# Patient Record
Sex: FEMALE | Race: WHITE | Employment: OTHER | URBAN - METROPOLITAN AREA
[De-identification: names, ages, dates, MRNs, and addresses within clinical notes are randomized per-mention and may not be internally consistent; named-entity substitution may affect disease eponyms.]

---

## 2017-04-23 ENCOUNTER — APPOINTMENT (OUTPATIENT)
Dept: CT IMAGING | Age: 82
DRG: 291 | End: 2017-04-23
Attending: EMERGENCY MEDICINE
Payer: MEDICARE

## 2017-04-23 ENCOUNTER — HOSPITAL ENCOUNTER (INPATIENT)
Age: 82
LOS: 4 days | Discharge: NURSING, REHAB OR LONG-TERM CARE | DRG: 291 | End: 2017-04-27
Attending: EMERGENCY MEDICINE | Admitting: INTERNAL MEDICINE
Payer: MEDICARE

## 2017-04-23 ENCOUNTER — APPOINTMENT (OUTPATIENT)
Dept: GENERAL RADIOLOGY | Age: 82
DRG: 291 | End: 2017-04-23
Attending: EMERGENCY MEDICINE
Payer: MEDICARE

## 2017-04-23 DIAGNOSIS — E87.70 HYPERVOLEMIA, UNSPECIFIED HYPERVOLEMIA TYPE: Primary | ICD-10-CM

## 2017-04-23 DIAGNOSIS — R06.02 SOB (SHORTNESS OF BREATH): ICD-10-CM

## 2017-04-23 PROBLEM — J96.91 RESPIRATORY FAILURE WITH HYPOXIA (HCC): Status: ACTIVE | Noted: 2017-04-23

## 2017-04-23 PROBLEM — I50.33 ACUTE ON CHRONIC DIASTOLIC (CONGESTIVE) HEART FAILURE (HCC): Status: ACTIVE | Noted: 2017-04-23

## 2017-04-23 LAB
ALBUMIN SERPL BCP-MCNC: 3.3 G/DL (ref 3.5–5)
ALBUMIN/GLOB SERPL: 1 {RATIO} (ref 1.1–2.2)
ALP SERPL-CCNC: 61 U/L (ref 45–117)
ALT SERPL-CCNC: 16 U/L (ref 12–78)
AMORPH CRY URNS QL MICRO: ABNORMAL
ANION GAP BLD CALC-SCNC: 9 MMOL/L (ref 5–15)
APPEARANCE UR: CLEAR
APTT PPP: 28 SEC (ref 22.1–32.5)
AST SERPL W P-5'-P-CCNC: 23 U/L (ref 15–37)
BACTERIA URNS QL MICRO: NEGATIVE /HPF
BASOPHILS # BLD AUTO: 0 K/UL
BASOPHILS # BLD: 1 %
BILIRUB SERPL-MCNC: 1.3 MG/DL (ref 0.2–1)
BILIRUB UR QL: NEGATIVE
BNP SERPL-MCNC: ABNORMAL PG/ML (ref 0–450)
BUN SERPL-MCNC: 38 MG/DL (ref 6–20)
BUN/CREAT SERPL: 29 (ref 12–20)
CALCIUM SERPL-MCNC: 8.8 MG/DL (ref 8.5–10.1)
CHLORIDE SERPL-SCNC: 108 MMOL/L (ref 97–108)
CK SERPL-CCNC: 92 U/L (ref 26–192)
CO2 SERPL-SCNC: 24 MMOL/L (ref 21–32)
COLOR UR: ABNORMAL
CREAT SERPL-MCNC: 1.29 MG/DL (ref 0.55–1.02)
DIFFERENTIAL METHOD BLD: ABNORMAL
EOSINOPHIL # BLD: 0.1 K/UL
EOSINOPHIL NFR BLD: 2 %
EPITH CASTS URNS QL MICRO: ABNORMAL /LPF
ERYTHROCYTE [DISTWIDTH] IN BLOOD BY AUTOMATED COUNT: 15.3 % (ref 11.5–14.5)
GLOBULIN SER CALC-MCNC: 3.2 G/DL (ref 2–4)
GLUCOSE BLD STRIP.AUTO-MCNC: 101 MG/DL (ref 65–100)
GLUCOSE SERPL-MCNC: 94 MG/DL (ref 65–100)
GLUCOSE UR STRIP.AUTO-MCNC: NEGATIVE MG/DL
HCT VFR BLD AUTO: 45.3 % (ref 35–47)
HGB BLD-MCNC: 14.4 G/DL (ref 11.5–16)
HGB UR QL STRIP: ABNORMAL
INR PPP: 1.3 (ref 0.9–1.1)
KETONES UR QL STRIP.AUTO: 15 MG/DL
LEUKOCYTE ESTERASE UR QL STRIP.AUTO: NEGATIVE
LYMPHOCYTES # BLD AUTO: 7 %
LYMPHOCYTES # BLD: 0.3 K/UL
MCH RBC QN AUTO: 29.9 PG (ref 26–34)
MCHC RBC AUTO-ENTMCNC: 31.8 G/DL (ref 30–36.5)
MCV RBC AUTO: 94.2 FL (ref 80–99)
MONOCYTES # BLD: 0.4 K/UL
MONOCYTES NFR BLD AUTO: 8 %
NEUTS SEG # BLD: 3.7 K/UL
NEUTS SEG NFR BLD AUTO: 82 %
NITRITE UR QL STRIP.AUTO: NEGATIVE
PH UR STRIP: 6 [PH] (ref 5–8)
PLATELET # BLD AUTO: 137 K/UL (ref 150–400)
POTASSIUM SERPL-SCNC: 5.1 MMOL/L (ref 3.5–5.1)
PROT SERPL-MCNC: 6.5 G/DL (ref 6.4–8.2)
PROT UR STRIP-MCNC: ABNORMAL MG/DL
PROTHROMBIN TIME: 12.7 SEC (ref 9–11.1)
RBC # BLD AUTO: 4.81 M/UL (ref 3.8–5.2)
RBC #/AREA URNS HPF: ABNORMAL /HPF (ref 0–5)
RBC MORPH BLD: ABNORMAL
SERVICE CMNT-IMP: ABNORMAL
SODIUM SERPL-SCNC: 141 MMOL/L (ref 136–145)
SP GR UR REFRACTOMETRY: 1.02 (ref 1–1.03)
THERAPEUTIC RANGE,PTTT: NORMAL SECS (ref 58–77)
TROPONIN I SERPL-MCNC: <0.04 NG/ML
UROBILINOGEN UR QL STRIP.AUTO: 0.2 EU/DL (ref 0.2–1)
WBC # BLD AUTO: 4.5 K/UL (ref 3.6–11)
WBC URNS QL MICRO: ABNORMAL /HPF (ref 0–4)

## 2017-04-23 PROCEDURE — 77030005563 HC CATH URETH INT MMGH -A

## 2017-04-23 PROCEDURE — 80053 COMPREHEN METABOLIC PANEL: CPT | Performed by: EMERGENCY MEDICINE

## 2017-04-23 PROCEDURE — 51701 INSERT BLADDER CATHETER: CPT

## 2017-04-23 PROCEDURE — 74011000250 HC RX REV CODE- 250: Performed by: EMERGENCY MEDICINE

## 2017-04-23 PROCEDURE — 74011250637 HC RX REV CODE- 250/637: Performed by: EMERGENCY MEDICINE

## 2017-04-23 PROCEDURE — 82550 ASSAY OF CK (CPK): CPT | Performed by: EMERGENCY MEDICINE

## 2017-04-23 PROCEDURE — 84484 ASSAY OF TROPONIN QUANT: CPT | Performed by: EMERGENCY MEDICINE

## 2017-04-23 PROCEDURE — 85610 PROTHROMBIN TIME: CPT | Performed by: EMERGENCY MEDICINE

## 2017-04-23 PROCEDURE — 82962 GLUCOSE BLOOD TEST: CPT

## 2017-04-23 PROCEDURE — 83880 ASSAY OF NATRIURETIC PEPTIDE: CPT | Performed by: EMERGENCY MEDICINE

## 2017-04-23 PROCEDURE — 94640 AIRWAY INHALATION TREATMENT: CPT

## 2017-04-23 PROCEDURE — 74011250636 HC RX REV CODE- 250/636: Performed by: EMERGENCY MEDICINE

## 2017-04-23 PROCEDURE — 65270000029 HC RM PRIVATE

## 2017-04-23 PROCEDURE — 70450 CT HEAD/BRAIN W/O DYE: CPT

## 2017-04-23 PROCEDURE — 77030013140 HC MSK NEB VYRM -A

## 2017-04-23 PROCEDURE — 74011250637 HC RX REV CODE- 250/637: Performed by: INTERNAL MEDICINE

## 2017-04-23 PROCEDURE — 85025 COMPLETE CBC W/AUTO DIFF WBC: CPT | Performed by: EMERGENCY MEDICINE

## 2017-04-23 PROCEDURE — 85730 THROMBOPLASTIN TIME PARTIAL: CPT | Performed by: EMERGENCY MEDICINE

## 2017-04-23 PROCEDURE — 71010 XR CHEST PORT: CPT

## 2017-04-23 PROCEDURE — 81001 URINALYSIS AUTO W/SCOPE: CPT | Performed by: EMERGENCY MEDICINE

## 2017-04-23 PROCEDURE — 36415 COLL VENOUS BLD VENIPUNCTURE: CPT | Performed by: EMERGENCY MEDICINE

## 2017-04-23 PROCEDURE — 99285 EMERGENCY DEPT VISIT HI MDM: CPT

## 2017-04-23 RX ORDER — SODIUM CHLORIDE 0.9 % (FLUSH) 0.9 %
5-10 SYRINGE (ML) INJECTION AS NEEDED
Status: DISCONTINUED | OUTPATIENT
Start: 2017-04-23 | End: 2017-04-27 | Stop reason: HOSPADM

## 2017-04-23 RX ORDER — DEXTROSE 50 % IN WATER (D50W) INTRAVENOUS SYRINGE
12.5-25 AS NEEDED
Status: DISCONTINUED | OUTPATIENT
Start: 2017-04-23 | End: 2017-04-27 | Stop reason: HOSPADM

## 2017-04-23 RX ORDER — NYSTATIN 100000 [USP'U]/G
POWDER TOPICAL
Status: COMPLETED | OUTPATIENT
Start: 2017-04-23 | End: 2017-04-23

## 2017-04-23 RX ORDER — FUROSEMIDE 10 MG/ML
60 INJECTION INTRAMUSCULAR; INTRAVENOUS 2 TIMES DAILY
Status: DISCONTINUED | OUTPATIENT
Start: 2017-04-24 | End: 2017-04-25

## 2017-04-23 RX ORDER — FUROSEMIDE 10 MG/ML
60 INJECTION INTRAMUSCULAR; INTRAVENOUS
Status: COMPLETED | OUTPATIENT
Start: 2017-04-23 | End: 2017-04-23

## 2017-04-23 RX ORDER — METOPROLOL SUCCINATE 50 MG/1
50 TABLET, EXTENDED RELEASE ORAL DAILY
Status: DISCONTINUED | OUTPATIENT
Start: 2017-04-24 | End: 2017-04-25

## 2017-04-23 RX ORDER — SODIUM CHLORIDE 0.9 % (FLUSH) 0.9 %
5-10 SYRINGE (ML) INJECTION EVERY 8 HOURS
Status: DISCONTINUED | OUTPATIENT
Start: 2017-04-23 | End: 2017-04-27 | Stop reason: HOSPADM

## 2017-04-23 RX ORDER — LISINOPRIL 20 MG/1
40 TABLET ORAL DAILY
Status: DISCONTINUED | OUTPATIENT
Start: 2017-04-24 | End: 2017-04-27 | Stop reason: HOSPADM

## 2017-04-23 RX ORDER — INSULIN GLARGINE 100 [IU]/ML
35 INJECTION, SOLUTION SUBCUTANEOUS DAILY
Status: DISCONTINUED | OUTPATIENT
Start: 2017-04-24 | End: 2017-04-24

## 2017-04-23 RX ORDER — ONDANSETRON 2 MG/ML
4 INJECTION INTRAMUSCULAR; INTRAVENOUS
Status: DISCONTINUED | OUTPATIENT
Start: 2017-04-23 | End: 2017-04-27 | Stop reason: HOSPADM

## 2017-04-23 RX ORDER — OXYBUTYNIN CHLORIDE 5 MG/1
5 TABLET, EXTENDED RELEASE ORAL DAILY
Status: DISCONTINUED | OUTPATIENT
Start: 2017-04-24 | End: 2017-04-27 | Stop reason: HOSPADM

## 2017-04-23 RX ORDER — ESCITALOPRAM OXALATE 10 MG/1
10 TABLET ORAL DAILY
Status: DISCONTINUED | OUTPATIENT
Start: 2017-04-24 | End: 2017-04-25

## 2017-04-23 RX ORDER — IPRATROPIUM BROMIDE AND ALBUTEROL SULFATE 2.5; .5 MG/3ML; MG/3ML
3 SOLUTION RESPIRATORY (INHALATION) ONCE
Status: COMPLETED | OUTPATIENT
Start: 2017-04-23 | End: 2017-04-23

## 2017-04-23 RX ORDER — ACETAMINOPHEN 325 MG/1
650 TABLET ORAL
Status: DISCONTINUED | OUTPATIENT
Start: 2017-04-23 | End: 2017-04-27 | Stop reason: HOSPADM

## 2017-04-23 RX ORDER — HYDRALAZINE HYDROCHLORIDE 20 MG/ML
10 INJECTION INTRAMUSCULAR; INTRAVENOUS
Status: DISCONTINUED | OUTPATIENT
Start: 2017-04-23 | End: 2017-04-27 | Stop reason: HOSPADM

## 2017-04-23 RX ORDER — INSULIN LISPRO 100 [IU]/ML
INJECTION, SOLUTION INTRAVENOUS; SUBCUTANEOUS
Status: DISCONTINUED | OUTPATIENT
Start: 2017-04-23 | End: 2017-04-27 | Stop reason: HOSPADM

## 2017-04-23 RX ORDER — MAGNESIUM SULFATE 100 %
4 CRYSTALS MISCELLANEOUS AS NEEDED
Status: DISCONTINUED | OUTPATIENT
Start: 2017-04-23 | End: 2017-04-27 | Stop reason: HOSPADM

## 2017-04-23 RX ORDER — PRAVASTATIN SODIUM 40 MG/1
40 TABLET ORAL
Status: DISCONTINUED | OUTPATIENT
Start: 2017-04-23 | End: 2017-04-27 | Stop reason: HOSPADM

## 2017-04-23 RX ADMIN — PRAVASTATIN SODIUM 40 MG: 40 TABLET ORAL at 22:20

## 2017-04-23 RX ADMIN — IPRATROPIUM BROMIDE AND ALBUTEROL SULFATE 3 ML: .5; 3 SOLUTION RESPIRATORY (INHALATION) at 14:22

## 2017-04-23 RX ADMIN — NYSTATIN: 100000 POWDER TOPICAL at 18:08

## 2017-04-23 RX ADMIN — Medication 10 ML: at 22:20

## 2017-04-23 RX ADMIN — FUROSEMIDE 60 MG: 10 INJECTION, SOLUTION INTRAMUSCULAR; INTRAVENOUS at 16:17

## 2017-04-23 RX ADMIN — APIXABAN 2.5 MG: 2.5 TABLET, FILM COATED ORAL at 22:20

## 2017-04-23 NOTE — PROGRESS NOTES
Spoke with Niru Wilkinson from [employer] regarding inspecting patients own CPAP machine from home. Ref# [id number].

## 2017-04-23 NOTE — IP AVS SNAPSHOT
[address] [facility] 
[phone] Patient: Leisa Ceballos MRN: [id number] MMN:[id number] You are allergic to the following No active allergies Recent Documentation Height Weight BMI OB Status Smoking Status 1.549 m 96.9 kg 40.38 kg/m2 Postmenopausal Never Smoker Emergency Contacts Name Discharge Info Relation Home Work Mobile Rona Morel 1943 CAREGIVER [3] Son [22]   [phone] Bon Araya DISCHARGE CAREGIVER [3] Son [22]   [phone] Nikki Araya  Other Relative [6]   [phone] [address] [place] [address] Rd  Son [22]   [phone] About your hospitalization You were admitted on:  April 23, 2017 You last received care in the:  [facility] You were discharged on:  April 27, 2017 Unit phone number:  [phone] Why you were hospitalized Your primary diagnosis was:  Not on File Your diagnoses also included:  Respiratory Failure With Hypoxia (Hcc), Acute On Chronic Diastolic (Congestive) Heart Failure (Hcc) Providers Seen During Your Hospitalizations Provider Role Specialty Primary office phone Stevennald CordsGil Johnson MD Attending Provider Emergency Medicine [phone] Barbara Glez MD Attending Provider Internal Medicine [phone] Geoffrey Martin MD Attending Provider Internal Medicine [phone] Your Primary Care Physician (PCP) Primary Care Physician Office Phone Office Fax Darren Hand [phone] Follow-up Information Follow up With Details Comments Contact Info [place]emayor MD   [address] [address] 
[phone] Jerardo Blake ([facility] [address]) On 4/27/2017 This is your skilled nursing provider. [address] [facility] 
[phone] Current Discharge Medication List  
  
 CONTINUE these medications which have CHANGED Dose & Instructions Dispensing Information Comments Morning Noon Evening Bedtime  
 apixaban 2.5 mg tablet Commonly known as:  Neida Seat What changed:   
- medication strength 
- how much to take Your last dose was: Your next dose is:    
   
   
 Dose:  2.5 mg Take 1 Tab by mouth two (2) times a day. Quantity:  60 Tab Refills:  0  
     
   
   
   
  
 escitalopram oxalate 10 mg tablet Commonly known as:  Aleatha Bernheim What changed:  Another medication with the same name was removed. Continue taking this medication, and follow the directions you see here. Your last dose was: Your next dose is:    
   
   
 Dose:  15 mg Take 15 mg by mouth daily. Refills:  0  
     
   
   
   
  
 LASIX 20 mg tablet Generic drug:  furosemide What changed:  Another medication with the same name was removed. Continue taking this medication, and follow the directions you see here. Your last dose was: Your next dose is:    
   
   
 Dose:  20 mg Take 20 mg by mouth daily. Refills:  0  
     
   
   
   
  
 metoprolol succinate 25 mg XL tablet Commonly known as:  TOPROL-XL What changed:   
- medication strength 
- how much to take Your last dose was: Your next dose is:    
   
   
 Dose:  25 mg Take 1 Tab by mouth daily. Quantity:  30 Tab Refills:  0 CONTINUE these medications which have NOT CHANGED Dose & Instructions Dispensing Information Comments Morning Noon Evening Bedtime  
 lisinopril 40 mg tablet Commonly known as:  Merna Gear Your last dose was: Your next dose is:    
   
   
 Dose:  40 mg Take 40 mg by mouth daily. Refills:  0  
     
   
   
   
  
 lovastatin 40 mg tablet Commonly known as:  MEVACOR Your last dose was: Your next dose is:    
   
   
 Dose:  40 mg Take 40 mg by mouth nightly. Refills:  0  
     
   
   
   
  
 oxybutynin chloride XL 5 mg CR tablet Commonly known as:  DITROPAN XL Your last dose was: Your next dose is:    
   
   
 Dose:  5 mg Take 1 Tab by mouth daily. Indications: URINARY URGE INCONTINENCE Quantity:  30 Tab Refills:  0 STOP taking these medications BACTRIM -800 mg per tablet Generic drug:  trimethoprim-sulfamethoxazole  
   
  
 insulin detemir 100 unit/mL (3 mL) Inpn Commonly known as:  LEVEMIR FLEXPEN  
   
  
 LEVEMIR FLEXPEN 100 unit/mL (3 mL) Inpn Generic drug:  insulin detemir LOPRESSOR 50 mg tablet Generic drug:  metoprolol tartrate Where to Get Your Medications Information on where to get these meds will be given to you by the nurse or doctor. ! Ask your nurse or doctor about these medications  
  apixaban 2.5 mg tablet  
 metoprolol succinate 25 mg XL tablet  
 oxybutynin chloride XL 5 mg CR tablet Discharge Instructions None Discharge Instructions Attachments/References HEART FAILURE ZONES: GENERAL INFO (ENGLISH) HEART FAILURE: AVOIDING TRIGGERS (ENGLISH) HEART FAILURE: SELF-CARE: GENERAL INFO (ENGLISH) [facility] Announce[facility] We are excited to announce that we are making your provider's discharge notes available to you in [employer]. You will see these notes when they are completed and signed by the physician that discharged you from your recent hospital stay. If you have any questions or concerns about any information you see in [employer], please call the Health Information Department where you were seen or reach out to your Primary Care Provider for more information about your plan of care. Introducing [facility]! Raysa Skelton introduces [employer] patient portal. Now you can access parts of your medical record, email your doctor's office, and request medication refills online. 1. In your internet browser, go to [email or web address] 2. Click on the First Time User? Click Here link in the Sign In box. You will see the New Member Sign Up page. 3. Enter your [employer] Access Code exactly as it appears below. You will not need to use this code after youve completed the sign-up process. If you do not sign up before the expiration date, you must request a new code. · [employer] Access Code: [email or web address] Expires: 7/22/2017 11:30 AM 
 
4. Enter the last four digits of your Social Security Number (xxxx) and Date of Birth (mm/dd/yyyy) as indicated and click Submit. You will be taken to the next sign-up page. 5. Create a [employer] ID. This will be your [employer] login ID and cannot be changed, so think of one that is secure and easy to remember. 6. Create a [employer] password. You can change your password at any time. 7. Enter your Password Reset Question and Answer. This can be used at a later time if you forget your password. 8. Enter your e-mail address. You will receive e-mail notification when new information is available in [address] 9. Click Sign Up. You can now view and download portions of your medical record. 10. Click the Download Summary menu link to download a portable copy of your medical information. If you have questions, please visit the Frequently Asked Questions section of the [employer] website. Remember, [employer] is NOT to be used for urgent needs. For medical emergencies, dial 911. Now available from your iPhone and Android! General Information Please provide this summary of care documentation to your next provider. Patient Signature:  ____________________________________________________________ Date:  ____________________________________________________________  
  
Flor Yates Provider Signature:  ____________________________________________________________ Date:  ____________________________________________________________ More Information Learning About Heart Failure Zones What are heart failure zones? Heart failure zones give you an easy way to see changes in your heart failure symptoms. They also tell you when you need to get help. Check every day to see which zone you are in. Green zone. You are doing well. This is where you want to be. · Your weight is stable. This means it is not going up or down. · You breathe easily. · You are sleeping well. You are able to lie flat without shortness of breath. · You can do your usual activities. Yellow zone. Be careful. Your symptoms are changing. Call your doctor. · You have new or increased shortness of breath. · You are dizzy or lightheaded, or you feel like you may faint. · You have sudden weight gain, such as 3 pounds or more in 2 to 3 days. · You have increased swelling in your legs, ankles, or feet. · You are so tired or weak that you cannot do your usual activities. · You are not sleeping well. Shortness of breath wakes you up at night. You need extra pillows. Your doctor's name: ____________________________________________________________ Your doctor's contact information: _________________________________________________ Red zone. This is an emergency. Call 911. You have symptoms of sudden heart failure, such as: 
· You have severe trouble breathing. · You cough up pink, foamy mucus. · You have a new irregular or fast heartbeat. You have symptoms of a heart attack. These may include: · Chest pain or pressure, or a strange feeling in the chest. 
· Sweating. · Shortness of breath. · Nausea or vomiting. · Pain, pressure, or a strange feeling in the back, neck, jaw, or upper belly or in one or both shoulders or arms. · Lightheadedness or sudden weakness. · A fast or irregular heartbeat.  
If you have symptoms of a heart attack: After you call 911, the  may tell you to chew 1 adult-strength or 2 to 4 low-dose aspirin. Wait for an ambulance. Do not try to drive yourself. Follow-up care is a key part of your treatment and safety. Be sure to make and go to all appointments, and call your doctor if you are having problems. It's also a good idea to know your test results and keep a list of the medicines you take. Where can you learn more? Go to http://analia-weston.info/. Enter T174 in the search box to learn more about \"Learning About Heart Failure Zones. \" Current as of: January 27, 2016 Content Version: 11.2 © 3950-9169 [employer]. Care instructions adapted under license by [employer] (which disclaims liability or warranty for this information). If you have questions about a medical condition or this instruction, always ask your healthcare professional. [place] [address] any warranty or liability for your use of this information. Avoiding Triggers With Heart Failure: Care Instructions Your Care Instructions Triggers are anything that make your heart failure flare up. A flare-up is also called \"sudden heart failure\" or \"acute heart failure. \" When you have a flare-up, fluid builds up in your lungs, and you have problems breathing. You might need to go to the hospital. By watching for changes in your condition and avoiding triggers, you can prevent heart failure flare-ups. Follow-up care is a key part of your treatment and safety. Be sure to make and go to all appointments, and call your doctor if you are having problems. It's also a good idea to know your test results and keep a list of the medicines you take. How can you care for yourself at home? Watch for changes in your weight and condition · Weigh yourself without clothing at the same time each day. Record your weight. Call your doctor if you gain 3 pounds or more in 2 to 3 days.  A sudden weight gain may mean that your heart failure is getting worse. · Keep a daily record of your symptoms. Write down any changes in how you feel, such as new shortness of breath, cough, or problems eating. Also record if your ankles are more swollen than usual and if you have to urinate in the night more often. Note anything that you ate or did that could have triggered these changes. Limit sodium Sodium causes your body to hold on to extra water. This may cause your heart failure symptoms to get worse. People get most of their sodium from processed foods. Fast food and restaurant meals also tend to be very high in sodium. · Your doctor may suggest that you limit sodium to 2,000 milligrams (mg) a day or less. That is less than 1 teaspoon of salt a day, including all the salt you eat in cooking or in packaged foods. · Read food labels on cans and food packages. They tell you how much sodium you get in one serving. Check the serving size. If you eat more than one serving, you are getting more sodium. · Be aware that sodium can come in forms other than salt, including monosodium glutamate (MSG), sodium citrate, and sodium bicarbonate (baking soda). MSG is often added to Asian food. You can sometimes ask for food without MSG or salt. · Slowly reducing salt will help you adjust to the taste. Take the salt shaker off the table. · Flavor your food with garlic, lemon juice, onion, vinegar, herbs, and spices instead of salt. Do not use soy sauce, steak sauce, onion salt, garlic salt, mustard, or ketchup on your food, unless it is labeled \"low-sodium\" or \"low-salt. \" 
· Make your own salad dressings, sauces, and ketchup without adding salt. · Use fresh or frozen ingredients, instead of canned ones, whenever you can. Choose low-sodium canned goods. · Eat less processed food and food from restaurants, including fast food. Exercise as directed Moderate, regular exercise is very good for your heart. It improves your blood flow and helps control your weight. But too much exercise can stress your heart and cause a heart failure flare-up. · Check with your doctor before you start an exercise program. 
· Walking is an easy way to get exercise. Start out slowly. Gradually increase the length and pace of your walk. Swimming, riding a bike, and using a treadmill are also good forms of exercise. · When you exercise, watch for signs that your heart is working too hard. You are pushing yourself too hard if you cannot talk while you are exercising. If you become short of breath or dizzy or have chest pain, stop, sit down, and rest. 
· Do not exercise when you do not feel well. Take medicines correctly · Take your medicines exactly as prescribed. Call your doctor if you think you are having a problem with your medicine. · Make a list of all the medicines you take. Include those prescribed to you by other doctors and any over-the-counter medicines, vitamins, or supplements you take. Take this list with you when you go to any doctor. · Take your medicines at the same time every day. It may help you to post a list of all the medicines you take every day and what time of day you take them. · Make taking your medicine as simple as you can. Plan times to take your medicines when you are doing other things, such as eating a meal or getting ready for bed. This will make it easier to remember to take your medicines. · Get organized. Use helpful tools, such as daily or weekly pill containers. When should you call for help? Call 911 if you have symptoms of sudden heart failure such as: 
· You have severe trouble breathing. · You cough up pink, foamy mucus. · You have a new irregular or rapid heartbeat. Call your doctor now or seek immediate medical care if: 
· You have new or increased shortness of breath. · You are dizzy or lightheaded, or you feel like you may faint. · You have sudden weight gain, such as 3 pounds or more in 2 to 3 days. · You have increased swelling in your legs, ankles, or feet. · You are suddenly so tired or weak that you cannot do your usual activities. Watch closely for changes in your health, and be sure to contact your doctor if you develop new symptoms. Where can you learn more? Go to http://analia-weston.info/. Enter J751 in the search box to learn more about \"Avoiding Triggers With Heart Failure: Care Instructions. \" Current as of: November 15, 2016 Content Version: 11.2 © 9681-3396 [employer]elight. Care instructions adapted under license by [employer] (which disclaims liability or warranty for this information). If you have questions about a medical condition or this instruction, always ask your healthcare professional. [place] [address] any warranty or liability for your use of this information. Learning About Self-Care for Heart Failure What is self-care for heart failure? Heart failure usually gets worse over time. But there are many things you can do to feel better, stay healthy longer, and avoid the hospital. 
Self-care means managing your health by doing certain things every day, like weighing yourself. It's about knowing which symptoms to watch for so you can avoid getting worse. When you practice good self-care, you know when it's time to call your doctor and when your heart failure has turned into an emergency. The lists below can help. Top 5 self-care tips for every day 1. Take your medicines as prescribed. This gives them the best chance of helping you. 2. Watch for signs that you're getting worse. Weighing yourself every day is one of the best ways to do this. Weight gain may be a sign that your body is holding on to too much fluid.  Weigh yourself at the same time each day, using the same scale, on a hard, flat surface. The best time is in the morning after you go to the bathroom and before you eat or drink anything. 3. Find out what your triggers are, and learn to avoid them. Triggers are things that make your heart failure worse, often suddenly. A trigger may be eating too much salt, missing a dose of your medicine, or exercising too hard. 4. Limit sodium. This helps keep fluid from building up and may help you feel better. Your doctor may suggest that you limit sodium to 2,000 milligrams (mg) a day or less. That's less than 1 teaspoon. You can stay under this amount by limiting the salt you eat at home and by watching for \"hidden\" sodium when you eat out or shop for food. 5. Try to exercise throughout the week. Exercise makes your heart stronger and can help you avoid symptoms. Walking is a great way to get exercise. If your doctor says it's safe, start out with some short walks, and then slowly build up to longer ones. When should you act? Try to become familiar with signs that mean your heart failure is getting worse. If you need help, talk with your doctor about making a personal plan. Here are some things to watch for as you practice your daily self-care. Call your doctor if: 
· You gain 3 pounds or more over 2 to 3 days. (Or your doctor may tell you how much weight to watch for.) · You have new or worse swelling in your feet, ankles, or legs. · Your breathing gets worse. Activities that did not make you short of breath before are hard for you now. · Your breathing when you lie down is worse than usual, or you wake up at night needing to catch your breath. Be sure to make and go to all of your follow-up appointments. And it's always a good idea to call your doctor anytime you have a sudden change in symptoms. When is it an emergency? Sometimes the symptoms get worse very quickly. This is called sudden heart failure. It causes fluid to build up in your lungs. Sudden heart failure is an emergency. If you have any of these symptoms, you need care right away. Call 911 if: 
· You have severe shortness of breath. · You have an irregular or fast heartbeat. · You cough up foamy, pink mucus. What else can you do to stay healthy? There are other things you can do to take care of your body and your heart. These things will help you feel better. And they will also reduce your risk of heart attack and stroke. · Try to stay at a healthy weight. Eat a healthy diet with lots of fresh fruit, vegetables, and whole grains. · If you smoke, quit. · Limit the amount of alcohol you drink. · Keep high blood pressure and diabetes under control. If you need help, talk with your doctor. If your doctor has not set you up with a cardiac rehabilitation (rehab) program, talk to him or her about whether that is right for you. Cardiac rehab includes exercise, help with diet and lifestyle changes, and emotional support. Also let your doctor know if: 
· You're having trouble sleeping. Sleep is important to your well-being. It also helps your heart work the way it's supposed to. Your doctor can help you decide if you need treatment for sleep problems. · You're feeling sad and hopeless much of the time, or you are worried and anxious. Heart failure can be hard on your emotions. Treatment with counseling and medicine can help. And when you feel better, you're more likely to take care of yourself. Follow-up care is a key part of your treatment and safety. Be sure to make and go to all appointments, and call your doctor if you are having problems. It's also a good idea to know your test results and keep a list of the medicines you take. Where can you learn more? Go to http://analia-weston.info/. Enter A063 in the search box to learn more about \"Learning About Self-Care for Heart Failure. \" Current as of: November 15, 2016 Content Version: 11.2 © 4367-2141 [employer]. Care instructions adapted under license by [employer] (which disclaims liability or warranty for this information). If you have questions about a medical condition or this instruction, always ask your healthcare professional. [address] any warranty or liability for your use of this information.

## 2017-04-23 NOTE — PROGRESS NOTES
Patient inc large amount urine, polo care completed. Zinc based crear applied, and placed foam dressing. Pea sized hole noted surrounded by pink blanchable skin. Applied lotion on entire body do to dry skin. Zinc ointment applied under both breasts and in groin fold. Patient tolerated care well. Report given to Adriana Morocho RN. Family at bedside.     Kaitlin Covarrubias RN

## 2017-04-23 NOTE — ED PROVIDER NOTES
HPI Comments: Lorenzo Covarrubias is a 80 y.o. female who presents via EMS to [address] [facility] ED with cc of increased SOB over the past week with associated productive cough with yellow sputum. Pt states that she was hospitalized for the same in July 2016 and has been having intermittent SOB since discharge, but increased over the past week with exacerbation on even brief exertion, prompting presentation to the ed today. She endorses hx of CHF, but denies any supplemental oxygen use. She states that SOB has prompted several GLFs and lightheadedness with positional changes in standing. Pt states that she is currently prescribed Eliquis secondary to hx of DVT/PE, but she forgot to take the medication yesterday. She specifically denies any chest pain, blood in the stool, abdominal pain, vomiting, or diarrhea. Duke Dee MD    PMHx: DM, CAD, HTN  Social Hx: - smoking; - EtOH; - illicit drug use    There are no other complains, changes, or physical findings at this time. The history is provided by the patient. No  was used. Past Medical History:   Diagnosis Date    CAD (coronary artery disease)     MI in 07    Diabetes ([facility] [address]     Hypertension     Other ill-defined conditions     Sleep apnea    Sleep disorder        Past Surgical History:   Procedure Laterality Date    HX APPENDECTOMY      HX CHOLECYSTECTOMY      HX HEENT      Tonsilectomy    HX ORTHOPAEDIC      Left knee replacement    HX ORTHOPAEDIC      Elbow         Family History:   Problem Relation Age of Onset    Hypertension Mother        Social History     Social History    Marital status:      Spouse name: N/A    Number of children: N/A    Years of education: N/A     Occupational History    Not on file.      Social History Main Topics    Smoking status: Never Smoker    Smokeless tobacco: Not on file    Alcohol use No    Drug use: No    Sexual activity: No     Other Topics Concern    Not on file Social History Narrative         ALLERGIES: Review of patient's allergies indicates no known allergies. Review of Systems   Constitutional: Negative for chills and fever. HENT: Positive for congestion. Eyes: Negative for visual disturbance. Respiratory: Positive for cough and shortness of breath. Negative for chest tightness. Cardiovascular: Negative for chest pain and leg swelling. Gastrointestinal: Negative for abdominal pain, blood in stool, constipation, diarrhea, nausea and vomiting. Endocrine: Negative for polyuria. Genitourinary: Negative for dysuria and frequency. Musculoskeletal: Negative for myalgias. Skin: Negative for color change. Allergic/Immunologic: Negative for immunocompromised state. Neurological: Positive for light-headedness. Negative for numbness. Vitals:    04/23/17 1117 04/23/17 1128 04/23/17 1129   BP: 179/76     Pulse: 64     Resp: 24     Temp: 97.4 °F (36.3 °C)     SpO2: (!) 89% (!) 89% 95%   Weight: 95.3 kg (210 lb)     Height: 5' 1\" (1.549 m)              Physical Exam   Nursing note and vitals reviewed.   General appearance: non-toxic, NAD  Eyes: PERRL, EOMI, conjunctiva normal, anicteric sclera  HEENT: mucous membranes dry, oropharynx is clear  Pulmonary: diminished breath sounds in the bases, no crackles  Cardiac: normal rate and regular rhythm, no murmurs, gallops, or rubs, 2+ peripheral pulses  Abdomen: soft, nontender, nondistended, bowel sounds present, umbilical hernia  MSK: chronic 2+ pitting edema to bilateral LE  Skin: chronic venous stasis changes to bilateral LE, cutaneous candida in inframammary folds   Neuro: Alert, answers questions appropriately, CN II-XII intact, moves all extremities    MDM  Number of Diagnoses or Management Options  Hypervolemia, unspecified hypervolemia type:   SOB (shortness of breath):   Diagnosis management comments: DDx: PNA, bronchitis, pulmonary edema, pleural effusion, orthostasis, ACS  Lower suspicion for PE given per anticoagulation       Amount and/or Complexity of Data Reviewed  Clinical lab tests: reviewed and ordered  Tests in the radiology section of CPT®: reviewed and ordered  Review and summarize past medical records: yes    Patient Progress  Patient progress: stable    ED Course       Procedures    CONSULT NOTE:   2:38 PM  Michael Matta MD  spoke with Dr. Lucio Medrano,   Specialty: Hospitalist   Discussed pt's hx, disposition, and available diagnostic and imaging results. Reviewed care plans. Consultant will evaluate and admit pt to the hospital.  Written by Cony Gunn ED Scribe, as dictated by Michael Matta MD .    LABORATORY TESTS:  Recent Results (from the past 12 hour(s))   CBC WITH AUTOMATED DIFF    Collection Time: 04/23/17 12:07 PM   Result Value Ref Range    WBC 4.5 3.6 - 11.0 K/uL    RBC 4.81 3.80 - 5.20 M/uL    HGB 14.4 11.5 - 16.0 g/dL    HCT 45.3 35.0 - 47.0 %    MCV 94.2 80.0 - 99.0 FL    MCH 29.9 26.0 - 34.0 PG    MCHC 31.8 30.0 - 36.5 g/dL    RDW 15.3 (H) 11.5 - 14.5 %    PLATELET 829 (L) 514 - 400 K/uL    NEUTROPHILS 82 %    LYMPHOCYTES 7 %    MONOCYTES 8 %    EOSINOPHILS 2 %    BASOPHILS 1 %    ABS. NEUTROPHILS 3.7 K/UL    ABS. LYMPHOCYTES 0.3 K/UL    ABS. MONOCYTES 0.4 K/UL    ABS. EOSINOPHILS 0.1 K/UL    ABS.  BASOPHILS 0.0 K/UL    RBC COMMENTS NORMOCYTIC, NORMOCHROMIC      DF MANUAL     PROTHROMBIN TIME + INR    Collection Time: 04/23/17 12:07 PM   Result Value Ref Range    INR 1.3 (H) 0.9 - 1.1      Prothrombin time 12.7 (H) 9.0 - 11.1 sec   PTT    Collection Time: 04/23/17 12:07 PM   Result Value Ref Range    aPTT 28.0 22.1 - 32.5 sec    aPTT, therapeutic range     58.0 - 04.8 SECS   METABOLIC PANEL, COMPREHENSIVE    Collection Time: 04/23/17 12:07 PM   Result Value Ref Range    Sodium 141 136 - 145 mmol/L    Potassium 5.1 3.5 - 5.1 mmol/L    Chloride 108 97 - 108 mmol/L    CO2 24 21 - 32 mmol/L    Anion gap 9 5 - 15 mmol/L    Glucose 94 65 - 100 mg/dL    BUN 38 (H) 6 - 20 MG/DL Creatinine 1.29 (H) 0.55 - 1.02 MG/DL    BUN/Creatinine ratio 29 (H) 12 - 20      GFR est AA 48 (L) >60 ml/min/1.73m2    GFR est non-AA 39 (L) >60 ml/min/1.73m2    Calcium 8.8 8.5 - 10.1 MG/DL    Bilirubin, total 1.3 (H) 0.2 - 1.0 MG/DL    ALT (SGPT) 16 12 - 78 U/L    AST (SGOT) 23 15 - 37 U/L    Alk. phosphatase 61 45 - 117 U/L    Protein, total 6.5 6.4 - 8.2 g/dL    Albumin 3.3 (L) 3.5 - 5.0 g/dL    Globulin 3.2 2.0 - 4.0 g/dL    A-G Ratio 1.0 (L) 1.1 - 2.2     PRO-BNP    Collection Time: 04/23/17 12:07 PM   Result Value Ref Range    NT pro-BNP 27541 (H) 0 - 450 PG/ML   CK W/ REFLX CKMB    Collection Time: 04/23/17 12:07 PM   Result Value Ref Range    CK 92 26 - 192 U/L   TROPONIN I    Collection Time: 04/23/17 12:07 PM   Result Value Ref Range    Troponin-I, Qt. <0.04 <0.05 ng/mL   URINALYSIS W/MICROSCOPIC    Collection Time: 04/23/17  1:05 PM   Result Value Ref Range    Color YELLOW/STRAW      Appearance CLEAR CLEAR      Specific gravity 1.025 1.003 - 1.030      pH (UA) 6.0 5.0 - 8.0      Protein TRACE (A) NEG mg/dL    Glucose NEGATIVE  NEG mg/dL    Ketone 15 (A) NEG mg/dL    Bilirubin NEGATIVE  NEG      Blood LARGE (A) NEG      Urobilinogen 0.2 0.2 - 1.0 EU/dL    Nitrites NEGATIVE  NEG      Leukocyte Esterase NEGATIVE  NEG      WBC 0-4 0 - 4 /hpf    RBC 20-50 0 - 5 /hpf    Epithelial cells FEW FEW /lpf    Bacteria NEGATIVE  NEG /hpf    Amorphous Crystals FEW (A) NEG         IMAGING RESULTS:  CT Results  (Last 48 hours)               04/23/17 1238  CT HEAD WO CONT Final result    Impression:  IMPRESSION:    1. No acute intracranial abnormality. 2. Microvascular ischemic and other stable age-related change. 3. Upward convexity of the pituitary gland which is unusual in this age group   but nonspecific. Correlate with clinical and endocrine symptoms. Narrative:  EXAM:  CT HEAD WO CONT       INDICATION:   dizziness       COMPARISON: 3/15/2011.        TECHNIQUE: Unenhanced CT of the head was performed using 5 mm images. Brain and   bone windows were generated. CT dose reduction was achieved through use of a   standardized protocol tailored for this examination and automatic exposure   control for dose modulation. FINDINGS:   Generalized prominence of cerebral sulci and ventricles is increased but   commensurate with age and stable. . Periventricular white matter low density is   patchy and diffuse, extending into the basal ganglia regions bilaterally, and   stable. . There is no intracranial hemorrhage, extra-axial collection, mass, mass   effect or midline shift. The basilar cisterns are open. No acute infarct is   identified. Incidentally noted upward convexity of the pituitary gland, somewhat   unusual in this age group but nonspecific. The bone windows demonstrate no   abnormalities. The visualized portions of the paranasal sinuses and mastoid air   cells are clear. CXR Results  (Last 48 hours)               04/23/17 1218  XR CHEST PORT Final result    Impression:  IMPRESSION:   No acute cardiopulmonary disease radiographically. . Minimal bibasilar   atelectasis or scar. .           Narrative:  INDICATION:  SOB        EXAM: Chest single view. COMPARISON: 5/25/2016, 3/31/2016. FINDINGS: A single frontal view of the chest at 1210 hours shows minimal   bibasilar atelectasis or scar. The heart, mediastinum and pulmonary vasculature   are stable with cardiomegaly . The bony thorax is unremarkable for age. Kathryn Snooks                  MEDICATIONS GIVEN:  Medications   apixaban (ELIQUIS) tablet 2.5 mg (2.5 mg Oral Given 4/24/17 0838)   escitalopram oxalate (LEXAPRO) tablet 10 mg (10 mg Oral Given 4/24/17 0838)   insulin glargine (LANTUS) injection 35 Units (35 Units SubCUTAneous Given 4/24/17 0846)   lisinopril (PRINIVIL, ZESTRIL) tablet 40 mg (40 mg Oral Given 4/24/17 0838)   pravastatin (PRAVACHOL) tablet 40 mg (40 mg Oral Given 4/23/17 2220)   metoprolol succinate (TOPROL-XL) XL tablet 50 mg (50 mg Oral Given 4/24/17 0838)   oxybutynin chloride XL (DITROPAN XL) tablet 5 mg (5 mg Oral Given 4/24/17 0838)   sodium chloride (NS) flush 5-10 mL (0 mL IntraVENous Held 4/24/17 0600)   sodium chloride (NS) flush 5-10 mL (not administered)   acetaminophen (TYLENOL) tablet 650 mg (not administered)   ondansetron (ZOFRAN) injection 4 mg (4 mg IntraVENous Given 4/24/17 0839)   furosemide (LASIX) injection 60 mg (60 mg IntraVENous Given 4/24/17 0838)   hydrALAZINE (APRESOLINE) 20 mg/mL injection 10 mg (not administered)   insulin lispro (HUMALOG) injection (0 Units SubCUTAneous Held 4/24/17 1146)   glucose chewable tablet 16 g (not administered)   dextrose (D50W) injection syrg 12.5-25 g (not administered)   glucagon (GLUCAGEN) injection 1 mg (not administered)   nystatin (MYCOSTATIN) 100,000 unit/gram powder ( Topical Given 4/23/17 1808)   albuterol-ipratropium (DUO-NEB) 2.5 MG-0.5 MG/3 ML (3 mL Nebulization Given 4/23/17 1422)   furosemide (LASIX) injection 60 mg (60 mg IntraVENous Given 4/23/17 1617)       VITALS (12HOURS):  Patient Vitals for the past 12 hrs:   Temp Pulse Resp BP SpO2   04/23/17 1129 - - - - 95 %   04/23/17 1128 - - - - (!) 89 %   04/23/17 1117 97.4 °F (36.3 °C) 64 24 179/76 (!) 89 %       IMPRESSION:  1. Hypervolemia, unspecified hypervolemia type    2. SOB (shortness of breath)        PLAN:  1. Admit to Hospitalist      Admission Note:  2:38 PM  Patient is being admitted to the hospital by Dr. Vivek Vitale. The results of their tests and reasons for their admission have been discussed with them and available family. They convey agreement and understanding for the need to be admitted and for their admission diagnosis. This note is prepared by Christiano Lerner, acting as Scribe for Dat Vicente MD .    Dat Vicente MD : The scribe's documentation has been prepared under my direction and personally reviewed by me in its entirety.  I confirm that the note above accurately reflects all work, treatment, procedures, and medical decision making performed by me.

## 2017-04-23 NOTE — ED NOTES
TRANSFER - OUT REPORT:    Verbal report given to Jennifer(name) on Luz Finley  being transferred to Orthopedics(unit) for routine progression of care       Report consisted of patients Situation, Background, Assessment and   Recommendations(SBAR). Information from the following report(s) SBAR, Kardex, ED Summary, Procedure Summary, Intake/Output, MAR and Recent Results was reviewed with the receiving nurse. Lines:   Peripheral IV 04/23/17 Left Antecubital (Active)   Site Assessment Clean, dry, & intact 4/23/2017 12:06 PM   Phlebitis Assessment 0 4/23/2017 12:06 PM   Infiltration Assessment 0 4/23/2017 12:06 PM   Dressing Status Clean, dry, & intact 4/23/2017 12:06 PM   Dressing Type Transparent;Tape 4/23/2017 12:06 PM   Hub Color/Line Status Pink;Flushed 4/23/2017 12:06 PM   Action Taken Blood drawn 4/23/2017 12:06 PM   Alcohol Cap Used No 4/23/2017 12:06 PM        Opportunity for questions and clarification was provided.       Patient transported with:   [employer]

## 2017-04-23 NOTE — ED TRIAGE NOTES
Patient arrives by EMS. Patient states that she had a ground level fall while in the shower, states that she did not hit her head, did not lose consciousness. States that her aid helped her down when she felt her leg giving out. Pt states that for a week she has been getting progressively short of breath. Reports SOB with exertion. Patient reports swelling on her legs that is her normal. Patient denies chest pain, n/v, denies urinary s/sx. No distress noted. Will continue to monitor.

## 2017-04-23 NOTE — H&P
Hospitalist Admission Note    NAME: Charlee Dance   :  1933   MRN:  [id number]     Date/Time:  2017 3:10 PM    Patient PCP: Skye Mora MD  ________________________________________________________________________    My assessment of this patient's clinical condition and my plan of care is as follows. Assessment / Plan:  SOB in setting of Acute on chronic diastolic heart failure  Severe Pulmonary hypertension  Moderate AS  -Trop wnl. Yeimi Espinosa [id number] compared to [facility] in may 2016. Has not been taking her lasix as prescribed. -CXR- No acute cardiopulmonary disease radiographically. Minimal bibasilar  atelectasis or scar.  -Wells score for PE is 1.5 making this low probability.  -Check EKG  -Admit to telemetry.  -supplemental O2 to maintain sat >90%. Wean as tolerated  -daily weight, strict I/O, fluid restriction.  -Check A1C, LDL, TSH  -continue statin, BB, ACEi. On eliquis. Not on ASA. -Start 60mg IV lasix bid. Monitor UOP and BMP. Adjust diuretic as needed.  -check repeat echo  -Follows with cardiology, Dr. Charmaine Kaba, consult while inpatient. DM II  -last A1C 5.9, repeat  -continue pta lantus 35 units qam, missed morning dose today  -Accuchecks and SSI    Hx of PE/DVT  -prior PE may of 2016  -continue on pta eliquis ? Start 2.5 bid as her PE was 1 year ago, pharm consult for med rec. HTN  -pta lisinopril and toprol  -prn IV hydralazine  HLD-pta statin  CKD 3-stable, monitor  ZEE  Depression-pta lexapro    Obesity, BMI 39    Incidental CT head finding \"Upward convexity of the pituitary gland which is unusual in this age group but nonspecific. \"  -no obvious endocrine symptoms  -check Thyroid studies and prolactin level    Code Status: full  Surrogate Decision Maker: son  DVT Prophylaxis: eliquis  GI Prophylaxis: not indicated  Baseline: lives at home, uses walker        Subjective:   CHIEF COMPLAINT: worsening VALDOVINOS    HISTORY OF PRESENT ILLNESS:     Adrian Palacio is a 80 y.o.   female who presents with progressively worsening VALDOVINOS and LE Edema x 3 days. She says she takes 60mg lasix daily but is unclear on what she is supposed to be taking at home. Has not taken her morning medications. Says she has issues with urinating at night and is not always compliant with her lasix. Endorses following a salt and fluid restricted diet. Endorses orthopnea, denies PND. She denies CP, cough, fever, chills, N/V, Diarrhea/constipation. In ED patient has elevated proBNP 93793 and trop wnl. She is hypoxic to the 80's on RA with otherwise stable vitals. Doing well now on 2L NC. Has a history of DVT/PE on eliquis. Not currently tachycardic, no LE pain. Wells score for PE of 1.5, low probability and no further imaging ordered. We were asked to admit for work up and evaluation of the above problems. Past Medical History:   Diagnosis Date    CAD (coronary artery disease)     MI in 07    Diabetes ([facility] [address]     Hypertension     Other ill-defined conditions     Sleep apnea    Sleep disorder         Past Surgical History:   Procedure Laterality Date    HX APPENDECTOMY      HX CHOLECYSTECTOMY      HX HEENT      Tonsilectomy    HX ORTHOPAEDIC      Left knee replacement    HX ORTHOPAEDIC      Elbow       Social History   Substance Use Topics    Smoking status: Never Smoker    Smokeless tobacco: Not on file    Alcohol use No        Family History   Problem Relation Age of Onset    Hypertension Mother      No Known Allergies     Prior to Admission medications    Medication Sig Start Date End Date Taking? Authorizing Provider   APIXABAN PO Take  by mouth. Yes Calderon Nagy MD   escitalopram oxalate (LEXAPRO) 10 mg tablet Take 1 Tab by mouth daily. 4/5/16  Yes Lora Larsen MD   insulin detemir (LEVEMIR FLEXPEN) 100 unit/mL (3 mL) inpn 0.4 mL by SubCUTAneous route nightly.  Indications: TYPE 2 DIABETES MELLITUS 4/5/16  Yes Lora Larsen MD   lisinopril (PRINIVIL, ZESTRIL) 40 mg tablet Take 40 mg by mouth daily. Historical Provider   metoprolol succinate (TOPROL-XL) 50 mg XL tablet Take  by mouth daily. Historical Provider   furosemide (LASIX) 80 mg tablet Take 80 mg by mouth two (2) times a day. Historical Provider   lovastatin (MEVACOR) 40 mg tablet Take 40 mg by mouth nightly. Historical Provider   oxybutynin chloride XL (DITROPAN XL) 5 mg CR tablet Take 1 Tab by mouth daily. Indications: URINARY URGE INCONTINENCE 4/5/16   Jayant Deal MD       REVIEW OF SYSTEMS:     I am not able to complete the review of systems because:    The patient is intubated and sedated    The patient has altered mental status due to his acute medical problems    The patient has baseline aphasia from prior stroke(s)    The patient has baseline dementia and is not reliable historian    The patient is in acute medical distress and unable to provide information           Total of 12 systems reviewed as follows:       POSITIVE= underlined text  Negative = text not underlined  General:  fever, chills, sweats, generalized weakness, weight loss/gain,      loss of appetite   Eyes:    blurred vision, eye pain, loss of vision, double vision  ENT:    rhinorrhea, pharyngitis   Respiratory:   cough, sputum production, SOB, VALDOVINOS, wheezing, pleuritic pain   Cardiology:   chest pain, palpitations, orthopnea, PND, edema, syncope   Gastrointestinal:  abdominal pain , N/V, diarrhea, dysphagia, constipation, bleeding   Genitourinary:  frequency, urgency, dysuria, hematuria, incontinence   Muskuloskeletal :  arthralgia, myalgia, back pain  Hematology:  easy bruising, nose or gum bleeding, lymphadenopathy   Dermatological: rash, ulceration, pruritis, color change / jaundice  Endocrine:   hot flashes or polydipsia   Neurological:  headache, dizziness, confusion, focal weakness, paresthesia,     Speech difficulties, memory loss, gait difficulty  Psychological: Feelings of anxiety, depression, agitation    Objective:   VITALS:    Visit Vitals    /76 (BP 1 Location: Right arm, BP Patient Position: At rest)    Pulse 64    Temp 97.4 °F (36.3 °C)    Resp 24    Ht 5' 1\" (1.549 m)    Wt 95.3 kg (210 lb)    SpO2 95%    BMI 39.68 kg/m2       PHYSICAL EXAM:    General:    Alert, cooperative, no distress, appears stated age. HEENT: Atraumatic, anicteric sclerae, pink conjunctivae     No oral ulcers, mucosa moist, throat clear, dentition poor  Neck:  Supple, symmetrical,  thyroid: non tender  Lungs:   Bibasilar rales. Chest wall:  No tenderness  No Accessory muscle use. Heart:   Regular  rhythm,  +  murmur   2+ edema  Abdomen:   Soft, non-tender. Not distended. Bowel sounds normal  Extremities: No cyanosis. No clubbing,      Skin turgor normal, Capillary refill normal, Radial dial pulse 2+  Skin:     Not pale. Not Jaundiced  No rashes   Psych:  Good insight. Not depressed. Not anxious or agitated. Neurologic: EOMs intact. No facial asymmetry. No aphasia or slurred speech. Symmetrical strength, Sensation grossly intact.  Alert and oriented X 4.     _______________________________________________________________________  Care Plan discussed with:    Comments   Patient x    Family  x  son   RN     Care Manager                    Consultant:      _______________________________________________________________________  Expected  Disposition:   Home with Family    HH/PT/OT/RN x   SNF/LTC    JULIEN    ________________________________________________________________________  TOTAL TIME:  61 Minutes    Critical Care Provided     Minutes non procedure based      Comments    x Reviewed previous records   >50% of visit spent in counseling and coordination of care  Discussion with patient and/or family and questions answered       ________________________________________________________________________  Signed: Alessandra Devlin MD    Procedures: see electronic medical records for all procedures/Xrays and details which were not copied into this note but were reviewed prior to creation of Plan. LAB DATA REVIEWED:    Recent Results (from the past 24 hour(s))   CBC WITH AUTOMATED DIFF    Collection Time: 04/23/17 12:07 PM   Result Value Ref Range    WBC 4.5 3.6 - 11.0 K/uL    RBC 4.81 3.80 - 5.20 M/uL    HGB 14.4 11.5 - 16.0 g/dL    HCT 45.3 35.0 - 47.0 %    MCV 94.2 80.0 - 99.0 FL    MCH 29.9 26.0 - 34.0 PG    MCHC 31.8 30.0 - 36.5 g/dL    RDW 15.3 (H) 11.5 - 14.5 %    PLATELET 500 (L) 094 - 400 K/uL    NEUTROPHILS 82 %    LYMPHOCYTES 7 %    MONOCYTES 8 %    EOSINOPHILS 2 %    BASOPHILS 1 %    ABS. NEUTROPHILS 3.7 K/UL    ABS. LYMPHOCYTES 0.3 K/UL    ABS. MONOCYTES 0.4 K/UL    ABS. EOSINOPHILS 0.1 K/UL    ABS. BASOPHILS 0.0 K/UL    RBC COMMENTS NORMOCYTIC, NORMOCHROMIC      DF MANUAL     PROTHROMBIN TIME + INR    Collection Time: 04/23/17 12:07 PM   Result Value Ref Range    INR 1.3 (H) 0.9 - 1.1      Prothrombin time 12.7 (H) 9.0 - 11.1 sec   PTT    Collection Time: 04/23/17 12:07 PM   Result Value Ref Range    aPTT 28.0 22.1 - 32.5 sec    aPTT, therapeutic range     58.0 - 13.4 SECS   METABOLIC PANEL, COMPREHENSIVE    Collection Time: 04/23/17 12:07 PM   Result Value Ref Range    Sodium 141 136 - 145 mmol/L    Potassium 5.1 3.5 - 5.1 mmol/L    Chloride 108 97 - 108 mmol/L    CO2 24 21 - 32 mmol/L    Anion gap 9 5 - 15 mmol/L    Glucose 94 65 - 100 mg/dL    BUN 38 (H) 6 - 20 MG/DL    Creatinine 1.29 (H) 0.55 - 1.02 MG/DL    BUN/Creatinine ratio 29 (H) 12 - 20      GFR est AA 48 (L) >60 ml/min/1.73m2    GFR est non-AA 39 (L) >60 ml/min/1.73m2    Calcium 8.8 8.5 - 10.1 MG/DL    Bilirubin, total 1.3 (H) 0.2 - 1.0 MG/DL    ALT (SGPT) 16 12 - 78 U/L    AST (SGOT) 23 15 - 37 U/L    Alk.  phosphatase 61 45 - 117 U/L    Protein, total 6.5 6.4 - 8.2 g/dL    Albumin 3.3 (L) 3.5 - 5.0 g/dL    Globulin 3.2 2.0 - 4.0 g/dL    A-G Ratio 1.0 (L) 1.1 - 2.2     PRO-BNP    Collection Time: 04/23/17 12:07 PM   Result Value Ref Range    NT pro-BNP 84762 (H) 0 - 450 PG/ML   CK W/ REFLX CKMB    Collection Time: 04/23/17 12:07 PM   Result Value Ref Range    CK 92 26 - 192 U/L   TROPONIN I    Collection Time: 04/23/17 12:07 PM   Result Value Ref Range    Troponin-I, Qt. <0.04 <0.05 ng/mL   URINALYSIS W/MICROSCOPIC    Collection Time: 04/23/17  1:05 PM   Result Value Ref Range    Color YELLOW/STRAW      Appearance CLEAR CLEAR      Specific gravity 1.025 1.003 - 1.030      pH (UA) 6.0 5.0 - 8.0      Protein TRACE (A) NEG mg/dL    Glucose NEGATIVE  NEG mg/dL    Ketone 15 (A) NEG mg/dL    Bilirubin NEGATIVE  NEG      Blood LARGE (A) NEG      Urobilinogen 0.2 0.2 - 1.0 EU/dL    Nitrites NEGATIVE  NEG      Leukocyte Esterase NEGATIVE  NEG      WBC 0-4 0 - 4 /hpf    RBC 20-50 0 - 5 /hpf    Epithelial cells FEW FEW /lpf    Bacteria NEGATIVE  NEG /hpf    Amorphous Crystals FEW (A) NEG

## 2017-04-24 LAB
ANION GAP BLD CALC-SCNC: 13 MMOL/L (ref 5–15)
BUN SERPL-MCNC: 36 MG/DL (ref 6–20)
BUN/CREAT SERPL: 28 (ref 12–20)
CALCIUM SERPL-MCNC: 8.7 MG/DL (ref 8.5–10.1)
CHLORIDE SERPL-SCNC: 109 MMOL/L (ref 97–108)
CHOLEST SERPL-MCNC: 89 MG/DL
CO2 SERPL-SCNC: 21 MMOL/L (ref 21–32)
CREAT SERPL-MCNC: 1.29 MG/DL (ref 0.55–1.02)
ERYTHROCYTE [DISTWIDTH] IN BLOOD BY AUTOMATED COUNT: 15.4 % (ref 11.5–14.5)
EST. AVERAGE GLUCOSE BLD GHB EST-MCNC: 114 MG/DL
GLUCOSE BLD STRIP.AUTO-MCNC: 108 MG/DL (ref 65–100)
GLUCOSE BLD STRIP.AUTO-MCNC: 81 MG/DL (ref 65–100)
GLUCOSE BLD STRIP.AUTO-MCNC: 92 MG/DL (ref 65–100)
GLUCOSE BLD STRIP.AUTO-MCNC: 99 MG/DL (ref 65–100)
GLUCOSE SERPL-MCNC: 81 MG/DL (ref 65–100)
HBA1C MFR BLD: 5.6 % (ref 4.2–6.3)
HCT VFR BLD AUTO: 41.7 % (ref 35–47)
HDLC SERPL-MCNC: 38 MG/DL
HDLC SERPL: 2.3 {RATIO} (ref 0–5)
HGB BLD-MCNC: 13 G/DL (ref 11.5–16)
LDLC SERPL CALC-MCNC: 33.8 MG/DL (ref 0–100)
LIPID PROFILE,FLP: NORMAL
MCH RBC QN AUTO: 29.5 PG (ref 26–34)
MCHC RBC AUTO-ENTMCNC: 31.2 G/DL (ref 30–36.5)
MCV RBC AUTO: 94.8 FL (ref 80–99)
PLATELET # BLD AUTO: 132 K/UL (ref 150–400)
POTASSIUM SERPL-SCNC: 4.7 MMOL/L (ref 3.5–5.1)
PROLACTIN SERPL-MCNC: 7.5 NG/ML
RBC # BLD AUTO: 4.4 M/UL (ref 3.8–5.2)
SERVICE CMNT-IMP: ABNORMAL
SERVICE CMNT-IMP: NORMAL
SODIUM SERPL-SCNC: 143 MMOL/L (ref 136–145)
T4 FREE SERPL-MCNC: 1.5 NG/DL (ref 0.8–1.5)
TRIGL SERPL-MCNC: 86 MG/DL (ref ?–150)
TSH SERPL DL<=0.05 MIU/L-ACNC: 1.36 UIU/ML (ref 0.36–3.74)
VLDLC SERPL CALC-MCNC: 17.2 MG/DL
WBC # BLD AUTO: 3.8 K/UL (ref 3.6–11)

## 2017-04-24 PROCEDURE — 84146 ASSAY OF PROLACTIN: CPT | Performed by: INTERNAL MEDICINE

## 2017-04-24 PROCEDURE — 74011250636 HC RX REV CODE- 250/636: Performed by: INTERNAL MEDICINE

## 2017-04-24 PROCEDURE — 77010033678 HC OXYGEN DAILY

## 2017-04-24 PROCEDURE — 83036 HEMOGLOBIN GLYCOSYLATED A1C: CPT | Performed by: INTERNAL MEDICINE

## 2017-04-24 PROCEDURE — 65270000029 HC RM PRIVATE

## 2017-04-24 PROCEDURE — G8979 MOBILITY GOAL STATUS: HCPCS

## 2017-04-24 PROCEDURE — 97162 PT EVAL MOD COMPLEX 30 MIN: CPT

## 2017-04-24 PROCEDURE — 97166 OT EVAL MOD COMPLEX 45 MIN: CPT | Performed by: OCCUPATIONAL THERAPIST

## 2017-04-24 PROCEDURE — G8987 SELF CARE CURRENT STATUS: HCPCS | Performed by: OCCUPATIONAL THERAPIST

## 2017-04-24 PROCEDURE — 80061 LIPID PANEL: CPT | Performed by: INTERNAL MEDICINE

## 2017-04-24 PROCEDURE — 74011250637 HC RX REV CODE- 250/637: Performed by: INTERNAL MEDICINE

## 2017-04-24 PROCEDURE — 84439 ASSAY OF FREE THYROXINE: CPT | Performed by: INTERNAL MEDICINE

## 2017-04-24 PROCEDURE — 82962 GLUCOSE BLOOD TEST: CPT

## 2017-04-24 PROCEDURE — 74011636637 HC RX REV CODE- 636/637: Performed by: INTERNAL MEDICINE

## 2017-04-24 PROCEDURE — G8988 SELF CARE GOAL STATUS: HCPCS | Performed by: OCCUPATIONAL THERAPIST

## 2017-04-24 PROCEDURE — 80048 BASIC METABOLIC PNL TOTAL CA: CPT | Performed by: INTERNAL MEDICINE

## 2017-04-24 PROCEDURE — G8978 MOBILITY CURRENT STATUS: HCPCS

## 2017-04-24 PROCEDURE — 85027 COMPLETE CBC AUTOMATED: CPT | Performed by: INTERNAL MEDICINE

## 2017-04-24 PROCEDURE — 84443 ASSAY THYROID STIM HORMONE: CPT | Performed by: INTERNAL MEDICINE

## 2017-04-24 PROCEDURE — 36415 COLL VENOUS BLD VENIPUNCTURE: CPT | Performed by: INTERNAL MEDICINE

## 2017-04-24 RX ORDER — METOPROLOL TARTRATE 50 MG/1
50 TABLET ORAL 2 TIMES DAILY
COMMUNITY
End: 2017-04-27

## 2017-04-24 RX ORDER — ESCITALOPRAM OXALATE 10 MG/1
15 TABLET ORAL DAILY
COMMUNITY

## 2017-04-24 RX ORDER — SULFAMETHOXAZOLE AND TRIMETHOPRIM 800; 160 MG/1; MG/1
1 TABLET ORAL 2 TIMES DAILY
COMMUNITY
Start: 2017-04-21 | End: 2017-04-27

## 2017-04-24 RX ORDER — GUAIFENESIN 100 MG/5ML
81 LIQUID (ML) ORAL DAILY
Status: DISCONTINUED | OUTPATIENT
Start: 2017-04-24 | End: 2017-04-24

## 2017-04-24 RX ORDER — FUROSEMIDE 20 MG/1
20 TABLET ORAL DAILY
COMMUNITY
End: 2017-06-19 | Stop reason: DRUGHIGH

## 2017-04-24 RX ADMIN — METOPROLOL SUCCINATE 50 MG: 50 TABLET, EXTENDED RELEASE ORAL at 08:38

## 2017-04-24 RX ADMIN — APIXABAN 2.5 MG: 2.5 TABLET, FILM COATED ORAL at 21:48

## 2017-04-24 RX ADMIN — Medication 10 ML: at 21:48

## 2017-04-24 RX ADMIN — LISINOPRIL 40 MG: 20 TABLET ORAL at 08:38

## 2017-04-24 RX ADMIN — APIXABAN 2.5 MG: 2.5 TABLET, FILM COATED ORAL at 08:38

## 2017-04-24 RX ADMIN — FUROSEMIDE 60 MG: 10 INJECTION, SOLUTION INTRAMUSCULAR; INTRAVENOUS at 18:27

## 2017-04-24 RX ADMIN — ESCITALOPRAM OXALATE 10 MG: 10 TABLET ORAL at 08:38

## 2017-04-24 RX ADMIN — PRAVASTATIN SODIUM 40 MG: 40 TABLET ORAL at 21:48

## 2017-04-24 RX ADMIN — FUROSEMIDE 60 MG: 10 INJECTION, SOLUTION INTRAMUSCULAR; INTRAVENOUS at 08:38

## 2017-04-24 RX ADMIN — OXYBUTYNIN CHLORIDE 5 MG: 5 TABLET, FILM COATED, EXTENDED RELEASE ORAL at 08:38

## 2017-04-24 RX ADMIN — INSULIN GLARGINE 35 UNITS: 100 INJECTION, SOLUTION SUBCUTANEOUS at 08:46

## 2017-04-24 RX ADMIN — ONDANSETRON HYDROCHLORIDE 4 MG: 2 INJECTION, SOLUTION INTRAMUSCULAR; INTRAVENOUS at 08:39

## 2017-04-24 NOTE — PROGRESS NOTES
Problem: Self Care Deficits Care Plan (Adult)  Goal: *Acute Goals and Plan of Care (Insert Text)  Occupational Therapy Goals:  Initiated 4/24/2017  1. Patient will perform basic grooming standing with minimal assistance within 7 days. 2. Patient will perform toileting with minimal assistance within 7 days. 3. Patient will perform lower body dressing with minimal assistance within 7 days. 4. Patient will transfer from toilet or BSC with supervision/set-up using the least restrictive device and appropriate durable medical equipment within 7 days. OCCUPATIONAL THERAPY EVALUATION  Patient: Leisa Ceballos (79 y.o. female)  Date: 4/24/2017  Primary Diagnosis: Respiratory failure with hypoxia (HCC)  Acute on chronic diastolic (congestive) heart failure (HCC)        Precautions:   Fall      ASSESSMENT :  Based on the objective data described below, the patient presents with limited hand function (baseline) and general weakness. Pt requires significant (max x2) assist for sit to stand from edge of bed and had heavy posterior lean in standing with max/total assist to correct. Constant support of rolling walker needed and pt was only able to take a few steps stand pivot to chair with CGA after assist to standing. Pt has lateral pinch with right hand and slight grasp with left hand. Pt reports that she needs assist with fastener and containers due to chronic hand deficits. She is performing UB ADLS at a set up to min assist level and lower body ADLS at a max total assist level. O2 sat on room air was 85% and pt needed 2 liters NC to maintain sats this session. Pt will need SNF for rehab at discharge. Patient will benefit from skilled intervention to address the above impairments.   Patients rehabilitation potential is considered to be Good  Factors which may influence rehabilitation potential include:   [X]             None noted  [ ]             Mental ability/status  [ ]             Medical condition  [ ]             Home/family situation and support systems  [ ]             Safety awareness  [ ]             Pain tolerance/management  [ ]             Other:        PLAN :  Recommendations and Planned Interventions:  [X]               Self Care Training                  [X]        Therapeutic Activities  [X]               Functional Mobility Training    [ ]        Cognitive Retraining  [X]               Therapeutic Exercises           [ ]        Endurance Activities  [X]               Balance Training                   [ ]        Neuromuscular Re-Education  [ ]               Visual/Perceptual Training     [X]   Home Safety Training  [X]               Patient Education                 [X]        Family Training/Education  [ ]               Other (comment):     Frequency/Duration: Patient will be followed by occupational therapy 4 times a week to address goals. Discharge Recommendations: Bashir Samuel  Further Equipment Recommendations for Discharge: O2? SUBJECTIVE:   Patient stated The firemen are my friends. When I fall they help me back up.       OBJECTIVE DATA SUMMARY:   HISTORY:   Past Medical History:   Diagnosis Date    CAD (coronary artery disease)       MI in 07    Diabetes ([facility] [address]      Hypertension      Other ill-defined conditions       Sleep apnea    Sleep disorder       Past Surgical History:   Procedure Laterality Date    HX APPENDECTOMY        HX CHOLECYSTECTOMY        HX HEENT         Tonsilectomy    HX ORTHOPAEDIC         Left knee replacement    HX ORTHOPAEDIC         Elbow        Prior Level of Function/Home Situation: has a sock aid but has her  don socks; pt had a aide to assist with bathing 2x a week but she had cardiac surgery; pts  has dementia; only able to get up from high surfaces at baseline; has a life alert; ambulated with rolling walker; falls at home  Expanded or extensive additional review of patient history:      [address] [place] [address] Environment: Private residence  # Steps to Enter: 3  Wheelchair Ramp: Yes  One/Two Story Residence: One story  Living Alone: No  Support Systems: Spouse/Significant Other/Partner, Friends \ neighbors, Family member(s)  Patient Expects to be Discharged to[de-identified] Unknown  Current DME Used/Available at Home: CPAP, Grab bars, Hospital bed, Walker, rolling, Adaptive dressing aides, Commode, bedside, Shower chair; reacher/sock aid  Tub or Shower Type: Shower  [X]  Right hand dominant             [ ]  Left hand dominant     EXAMINATION OF PERFORMANCE DEFICITS:  Cognitive/Behavioral Status:  Neurologic State: Alert  Orientation Level: Oriented X4  Cognition: Follows commands  Perception: Appears intact  Perseveration: No perseveration noted  Safety/Judgement: Awareness of environment; Fall prevention           Hearing: Auditory  Auditory Impairment: None     Vision/Perceptual:    Tracking: Able to track stimulus in all quadrants w/o difficulty                      Acuity: Within Defined Limits          Range of Motion:     AROM: Generally decreased, functional  PROM: Generally decreased, functional                       Strength:     Strength: Generally decreased, functional                 Coordination:  Coordination: Generally decreased, functional  Fine Motor Skills-Upper: Left Impaired;Right Impaired (lateral pinch Right hand;limited left hand (baseline)    Gross Motor Skills-Upper: Left Intact; Right Intact     Tone & Sensation:     Tone: Normal                          Balance:  Sitting: Intact  Standing: Impaired  Standing - Static: Poor  Standing - Dynamic : Poor     Functional Mobility and Transfers for ADLs:  Bed Mobility:  Rolling: Maximum assistance  Supine to Sit: Maximum assistance;Assist x2     Transfers:  Sit to Stand: Maximum assistance;Assist x2  Stand to Sit: Moderate assistance  Bed to Chair: Moderate assistance (sit to stand; CGA to stand pivot to chair w/ RW)  Toilet Transfer : Maximum assistance     ADL Assessment:  Feeding: Setup     Oral Facial Hygiene/Grooming: Setup     Bathing: Maximum assistance     Upper Body Dressing: Minimum assistance     Lower Body Dressing: Maximum assistance     Toileting: Maximum assistance                 ADL Intervention and task modifications:  See assessment  Cognitive Retraining  Safety/Judgement: Awareness of environment; Fall prevention        Functional Measure:  Barthel Index:      Bathin  Bladder: 5  Bowels: 5  Groomin  Dressin  Feedin  Mobility: 0  Stairs: 0  Toilet Use: 5  Transfer (Bed to Chair and Back): 5  Total: 30         Barthel and G-code impairment scale:  Percentage of impairment CH  0% CI  1-19% CJ  20-39% CK  40-59% CL  60-79% CM  80-99% CN  100%   Barthel Score 0-100 100 99-80 79-60 59-40 20-39 1-19    0   Barthel Score 0-20 20 17-19 13-16 9-12 5-8 1-4 0      The Barthel ADL Index: Guidelines  1. The index should be used as a record of what a patient does, not as a record of what a patient could do. 2. The main aim is to establish degree of independence from any help, physical or verbal, however minor and for whatever reason. 3. The need for supervision renders the patient not independent. 4. A patient's performance should be established using the best available evidence. Asking the patient, friends/relatives and nurses are the usual sources, but direct observation and common sense are also important. However direct testing is not needed. 5. Usually the patient's performance over the preceding 24-48 hours is important, but occasionally longer periods will be relevant. 6. Middle categories imply that the patient supplies over 50 per cent of the effort. 7. Use of aids to be independent is allowed. Melisa Hess., Barthel, D.W. (2444). Functional evaluation: the Barthel Index. [facility] (14)2. GARRICK Rodriguez, Rai Mosher., Zachery Phelps., Chau, [facility] ().  Measuring the change indisability after inpatient rehabilitation; comparison of the responsiveness of the Barthel Index and Functional Dyer Measure. Journal of Neurology, Neurosurgery, and Psychiatry, 66(4), 321-196. KATTY uBstamante, MARIA LUZ Page, & Ben Gunn M.A. (2004.) Assessment of post-stroke quality of life in cost-effectiveness studies: The usefulness of the Barthel Index and the EuroQoL-5D. Quality of Life Research, 13, 443-05            G codes: In compliance with CMSs Claims Based Outcome Reporting, the following G-code set was chosen for this patient based on their primary functional limitation being treated: The outcome measure chosen to determine the severity of the functional limitation was the barthel with a score of 30/100 which was correlated with the impairment scale. · Self Care:               - CURRENT STATUS:    CL - 60%-79% impaired, limited or restricted               - GOAL STATUS:           CK - 40%-59% impaired, limited or restricted               - D/C STATUS:                       ---------------To be determined---------------      Occupational Therapy Evaluation Charge Determination   History Examination Decision-Making   MEDIUM Complexity : Expanded review of history including physical, cognitive and psychosocial  history  HIGH Complexity : 5 or more performance deficits relating to physical, cognitive , or psychosocial skils that result in activity limitations and / or participation restrictions MEDIUM Complexity : Patient may present with comorbidities that affect occupational performnce. Miniml to moderate modification of tasks or assistance (eg, physical or verbal ) with assesment(s) is necessary to enable patient to complete evaluation       Based on the above components, the patient evaluation is determined to be of the following complexity level: MEDIUM  Pain:                    Activity Tolerance:      Please refer to the flowsheet for vital signs taken during this treatment.   After treatment:   [X] Patient left in no apparent distress sitting up in chair  [ ] Patient left in no apparent distress in bed  [X] Call bell left within reach  [X] Nursing notified  [ ] Caregiver present  [X] Bed alarm activated      COMMUNICATION/EDUCATION:   The patients plan of care was discussed with: Physical Therapist, Registered Nurse and patient. [ ] Home safety education was provided and the patient/caregiver indicated understanding. [X] Patient have participated as able in goal setting and plan of care. [X] Patient agree to work toward stated goals and plan of care. [ ] Patient understands intent and goals of therapy, but is neutral about his/her participation. [ ] Patient is unable to participate in goal setting and plan of care. This patients plan of care is appropriate for delegation to \[facility]\"".      Thank you for this referral.  Josefine Osler, OTR/L  Time Calculation: 20 mins

## 2017-04-24 NOTE — PROGRESS NOTES
Initial Nutrition Assessment:    INTERVENTIONS/RECOMMENDATIONS:   · Meals/Snacks: General/healthful diet: Continue CCD (2mg Na, 1200mL FR and low chol-fat)  · Supplements: Commercial supplement: Try Gelatein and Glucerna Daily    ASSESSMENT:   Pt medically noted for acute hypoxic respiratory failure and pulmonary hypertension. PMH listed below. Charts reviewed. Pt was alert and conversational at time of visit, no family members present at bedside. Pt states that her appetite is good and she \"eats just about anything\". Menu discussed and protein sources encouraged (chicken, fish, etc.). Pt does not have her glasses to read the menu but states son is coming for a visit. Encouraged son to call or dietary office can aid pt in food selection. Supplements discussed in order to increase protein intake, pt open to trying gelatin and Glucerna daily (liquid supplementation limited with fluid restriction). Will follow pt appetite/PO intake and acceptance to supplements. Past Medical History:   Diagnosis Date    CAD (coronary artery disease)     MI in 07    Diabetes ([facility] [address]     Hypertension     Other ill-defined conditions     Sleep apnea    Sleep disorder          Diet Order: Consistent carb (2mg Na; 1200mL FR; Low Chol-fat)  % Eaten:  No data found. Pertinent Medications: [x]Reviewed []Other: Lasix;  Lantus; Humalog; Troprol-xl; pravastatin; Ditropan-xl; lexapro; lisinopril  Pertinent Labs: [x]Reviewed []Other: BG-  Food Allergies: [x]None []Other   Last BM: No Documentation   [x]Active     []Hyperactive  []Hypoactive       [] Absent BS  Skin:    [] Intact   [] Incision  [x] Breakdown (Stage III PU: posterior sacral/coccyx) [x] Other: Edema (2+ BLE)    Anthropometrics:   Height: 5' 1\" (154.9 cm) Weight: 95.7 kg (210 lb 15.7 oz)   IBW (%IBW):   ( ) UBW (%UBW):   (  %)   Last Weight Metrics:  Weight Loss Metrics 4/24/2017 10/20/2016 7/22/2016 6/17/2016 5/27/2016 4/5/2016 5/6/2015   Today's Wt 210 lb 15.7 oz 209 lb 197 lb 9.6 oz 202 lb 205 lb 14.6 oz 217 lb 14.4 oz 240 lb   BMI 39.86 kg/m2 39.49 kg/m2 37.36 kg/m2 35.79 kg/m2 36.48 kg/m2 39.84 kg/m2 42.52 kg/m2       BMI: Body mass index is 39.86 kg/(m^2). This BMI is indicative of:   []Underweight    []Normal    []Overweight    [x] Obesity   [] Extreme Obesity (BMI>40)     Estimated Nutrition Needs (Based on):   1751 Kcals/day (BMR 1346 x 1. 3AF) , 115 g (1.2g/kg bw) Protein  Carbohydrate: At Least 130 g/day  Fluids: 1200 mL/day (1ml/kcal) per MD    Pt expected to meet estimated nutrient needs: [x]Yes []No    NUTRITION DIAGNOSES:   Problem:  Increased protein needs      Etiology: related to wound healing     Signs/Symptoms: as evidenced by Stage III Pressure Ulcer on posterior sacral/coccyx    NUTRITION INTERVENTIONS:  Meals/Snacks: General/healthful diet   Supplements: Commercial supplement              GOAL:   PO intake >75% meals/supplements next 2-4 days    LEARNING NEEDS (Diet, Food/Nutrient-Drug Interaction):    [x] None Identified   [] Identified and Education Provided/Documented   [] Identified and Pt declined/was not appropriate     Cultureal, Hinduism, OR Ethnic Dietary Needs:    [x] None Identified   [] Identified and Addressed     [x] Interdisciplinary Care Plan Reviewed/Documented    [x] Discharge Planning: Continue Consistent Carbohydrate Diet with an emphasis on protein for wound healing    MONITORING /EVALUATION:      Food/Nutrient Intake Outcomes:  Total energy intake, Liquid meal replacement  Physical Signs/Symptoms Outcomes: Weight/weight change, Electrolyte and renal profile, GI profile, Glucose profile, GI    NUTRITION RISK:    [x] High              [x] Moderate           []  Low  []  Minimal/Uncompromised    PT SEEN FOR:    []  MD Consult: []Calorie Count      []Diabetic Diet Education        []Diet Education     []Electrolyte Management     []General Nutrition Management and Supplements     []Management of Tube Feeding     []TPN Recommendations [x]  RN Referral:  []MST score >=2     []Enteral/Parenteral Nutrition PTA     []Pregnant: Gestational DM or Multigestation     [x]Pressure Ulcer/Wound Care needs        []  Low BMI  []  DTR Referral       Francisca Shah  Pager [phone]  Weekend Pager [phone]

## 2017-04-24 NOTE — PROGRESS NOTES
Pharmacy Medication Reconciliation     Recommendations/Findings:   1) Apixaban dose is 5 mg twice daily for PE from May 2016, however patient has not filled medication since 2016. Recommended dosing for apixaban is 2.5 mg twice daily 6 months following treatment for PE. 2) Doses of Levemir and escitalopram were increased. Changed metoprolol from once daily to twice daily. Furosemide dose decreased significantly from 80 mg twice daily to 20 mg daily. 3) Patient was to start a 10 day course of Bactrim on 17.      -Clarified PTA med list with [facility] and patients PCP Dr. Kash Tarango. PTA medication list was corrected to the following:     Prior to Admission Medications   Prescriptions Last Dose Informant Patient Reported? Taking? APIXABAN PO Not Taking at Unknown time  Yes No   Sig: Take 5 mg by mouth two (2) times a day. escitalopram oxalate (LEXAPRO) 10 mg tablet Unknown at Unknown time  Yes No   Sig: Take 15 mg by mouth daily. furosemide (LASIX) 20 mg tablet Unknown at Unknown time  Yes No   Sig: Take 20 mg by mouth daily. insulin detemir (LEVEMIR FLEXPEN) 100 unit/mL (3 mL) inpn Unknown at Unknown time  Yes No   Si Units by SubCUTAneous route nightly. lisinopril (PRINIVIL, ZESTRIL) 40 mg tablet Unknown at Unknown time  Yes No   Sig: Take 40 mg by mouth daily. lovastatin (MEVACOR) 40 mg tablet Unknown at Unknown time  Yes No   Sig: Take 40 mg by mouth nightly. metoprolol tartrate (LOPRESSOR) 50 mg tablet Unknown at Unknown time  Yes No   Sig: Take 50 mg by mouth two (2) times a day. trimethoprim-sulfamethoxazole (BACTRIM DS) 160-800 mg per tablet Unknown at Unknown time  Yes No   Sig: Take 1 Tab by mouth two (2) times a day.  For 10 days      Facility-Administered Medications: None          Thank you,  Rae Rehman, PHARMD

## 2017-04-24 NOTE — CDMP QUERY
2  There is noted documentation of History of PE in the medical record. Please clarify if this condition could be further specified as:      Acute PE  Acute Recurrent PE  Chronic PE    The medical record reflects the following risk factors, clinical indicators, and treatment    79 y/o female admitted for acute on chronic diastolic heart failure. On admission is also noted \"history of PE/DVT with most recent PE May of 2016. This patient is on home therapy with po eliquis    Reference:  Acute PE:  \"Acute\" defines the period of time beginning with the initial diagnosis, up to and including the entire period of time where anticoagulation is instituted (3-12 months)     Acute Recurrent PE: \"Recurrent\" incorporates the definition of acute with the diagnosis of recurrent and ends 3-12 months beyond that time. These patients will likely require lifelong anticoagulation therapy. 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 Please clarify and document your clinical opinion in the progress notes and discharge summary including the definitive and/or presumptive diagnosis, (suspected or probable), related to the above clinical findings. Please include clinical findings supporting your diagnosis. Thanks for your time.     Jenniffer Virk RN, BSN  [phone]

## 2017-04-24 NOTE — CONSULTS
Consult    NAME: Vick Desouza   :  1933   MRN:  [id number]     Date/Time:  2017 10:33 AM    Patient PCP: Nicolle Mcallister MD  ________________________________________________________________________     Assessment:     1. Non-compliant with diuretic due to difficulty getting to bathroom now with Acute on chronic diastolic chf  2. Hx of cath in past with mild CAD  3. Echo 2016 EF 60% with mild to mod AS mean of 18, sev pHTN  4. Bifasicular block  5. HTN  6. DM  7. Lipids ok  8. CKD stage 3  9. Hx of PE in 2016  10. Chronic back pain  11. Obesity, ZEE on CpAP  12. ,  with dementia, uses a walker  15. Cardiologist:  [place]        Plan:     Difficulty taking diuretic due to difficulty getting to bathroom, incontinence. Patient admits to not taking furosemide. Now with acute on chronic diastolic chf with volume overload, hypoxia. 1. Cont eliquis  2. Cont metoprolol  3. Cont lisinopril  4. Cont furosemide IV for now, discharge back on po lasix in daily regimen to reduce night time urination    Case management consult to see what services, help may be available for this elederly couple. [x]        High complexity decision making was performed        Subjective:   CHIEF COMPLAINT: Dyspnea    HISTORY OF PRESENT ILLNESS:       Jairo Mosqueda is a 80 y.o.  female who presents with progressively worsening VALDOVINOS and LE Edema x 3 days. She says she takes 60mg lasix daily but is unclear on what she is supposed to be taking at home. Has not taken her morning medications. Says she has issues with urinating at night and is not always compliant with her lasix. Endorses following a salt and fluid restricted diet. Endorses orthopnea, denies PND. She denies CP, cough, fever, chills, N/V, Diarrhea/constipation.      In ED patient has elevated proBNP 31293 and trop wnl. She is hypoxic to the 80's on RA with otherwise stable vitals. Doing well now on 2L NC.  Has a history of DVT/PE on eliquis. Not currently tachycardic, no LE pain. Wells score for PE of 1.5, low probability and no further imaging ordered. Currently feeling better after diuresis. No chest pain. +dyspnea, +edema, +orthopnea, no palpitations, no syncope. No other complaints. We were asked to consult for work up and evaluation of the above problems. Past Medical History:   Diagnosis Date    CAD (coronary artery disease)     MI in 07    Diabetes ([facility] [address]     Hypertension     Other ill-defined conditions     Sleep apnea    Sleep disorder       Past Surgical History:   Procedure Laterality Date    HX APPENDECTOMY      HX CHOLECYSTECTOMY      HX HEENT      Tonsilectomy    HX ORTHOPAEDIC      Left knee replacement    HX ORTHOPAEDIC      Elbow     No Known Allergies   Meds:  See below  Social History   Substance Use Topics    Smoking status: Never Smoker    Smokeless tobacco: Not on file    Alcohol use No      Family History   Problem Relation Age of Onset    Hypertension Mother        REVIEW OF SYSTEMS:     []         Unable to obtain  ROS due to ---   [x]         Total of 12 systems reviewed as follows:     Total of 12 systems reviewed as follows:       POSITIVE= Bold text  Negative = normal text  General:  fever, chills, sweats, generalized weakness, weight loss/gain,      loss of appetite   Eyes:    blurred vision, eye pain, loss of vision, double vision  ENT:    rhinorrhea, pharyngitis   Respiratory:   cough, sputum production, SOB, VALDOVINOS, wheezing, pleuritic pain   Cardiology:   chest pain, palpitations, orthopnea, PND, edema, syncope   Gastrointestinal:  abdominal pain , N/V, diarrhea, dysphagia, constipation, bleeding   Genitourinary:  frequency, urgency, dysuria, hematuria, incontinence   Muskuloskeletal :  arthralgia, myalgia, back pain  Hematology:  easy bruising, nose or gum bleeding, lymphadenopathy   Dermatological: rash, ulceration, pruritis, color change / jaundice  Endocrine:   hot flashes or polydipsia   Neurological:  headache, dizziness, confusion, focal weakness, paresthesia,     Speech difficulties, memory loss, gait difficulty  Psychological: Feelings of anxiety, depression, agitation    Objective:      Physical Exam:    Last 24hrs VS reviewed since prior progress note. Most recent are:    Visit Vitals    /53    Pulse 63    Temp 97.8 °F (36.6 °C)    Resp 22    Ht 5' 1\" (1.549 m)    Wt 95.7 kg (210 lb 15.7 oz)    SpO2 90%    BMI 39.86 kg/m2     No intake or output data in the 24 hours ending 04/24/17 1033     General Appearance: Well developed, elderly, alert & oriented x 3,    no acute distress. Ears/Nose/Mouth/Throat: Pupils equal and round, Hearing grossly normal.  Neck: Supple. JVP elevated. Carotids good upstrokes, with no bruit. Chest: Lungs clear to auscultation bilaterally. Cardiovascular: Regular rate and rhythm, S1S2 normal, II/VI BETTY, rubs, gallops. Abdomen: Soft, non-tender, bowel sounds are active. No organomegaly. Extremities: +2 edema bilaterally. Femoral pulses +2, Distal Pulses +1. Skin: Warm and dry. Neuro: CN II-XII grossly intact, Strength and sensation grossly intact. Data:      Prior to Admission medications    Medication Sig Start Date End Date Taking? Authorizing Provider   APIXABAN PO Take  by mouth. Yes Calderon Nagy MD   escitalopram oxalate (LEXAPRO) 10 mg tablet Take 1 Tab by mouth daily. 4/5/16  Yes James Jimenez MD   insulin detemir (LEVEMIR FLEXPEN) 100 unit/mL (3 mL) inpn 0.4 mL by SubCUTAneous route nightly. Indications: TYPE 2 DIABETES MELLITUS 4/5/16  Yes James Jimenez MD   lisinopril (PRINIVIL, ZESTRIL) 40 mg tablet Take 40 mg by mouth daily. Historical Provider   metoprolol succinate (TOPROL-XL) 50 mg XL tablet Take  by mouth daily. Historical Provider   furosemide (LASIX) 80 mg tablet Take 80 mg by mouth two (2) times a day. Historical Provider   lovastatin (MEVACOR) 40 mg tablet Take 40 mg by mouth nightly. Historical Provider   oxybutynin chloride XL (DITROPAN XL) 5 mg CR tablet Take 1 Tab by mouth daily. Indications: URINARY URGE INCONTINENCE 4/5/16   Veena Kaye MD       Recent Results (from the past 24 hour(s))   CBC WITH AUTOMATED DIFF    Collection Time: 04/23/17 12:07 PM   Result Value Ref Range    WBC 4.5 3.6 - 11.0 K/uL    RBC 4.81 3.80 - 5.20 M/uL    HGB 14.4 11.5 - 16.0 g/dL    HCT 45.3 35.0 - 47.0 %    MCV 94.2 80.0 - 99.0 FL    MCH 29.9 26.0 - 34.0 PG    MCHC 31.8 30.0 - 36.5 g/dL    RDW 15.3 (H) 11.5 - 14.5 %    PLATELET 984 (L) 058 - 400 K/uL    NEUTROPHILS 82 %    LYMPHOCYTES 7 %    MONOCYTES 8 %    EOSINOPHILS 2 %    BASOPHILS 1 %    ABS. NEUTROPHILS 3.7 K/UL    ABS. LYMPHOCYTES 0.3 K/UL    ABS. MONOCYTES 0.4 K/UL    ABS. EOSINOPHILS 0.1 K/UL    ABS. BASOPHILS 0.0 K/UL    RBC COMMENTS NORMOCYTIC, NORMOCHROMIC      DF MANUAL     PROTHROMBIN TIME + INR    Collection Time: 04/23/17 12:07 PM   Result Value Ref Range    INR 1.3 (H) 0.9 - 1.1      Prothrombin time 12.7 (H) 9.0 - 11.1 sec   PTT    Collection Time: 04/23/17 12:07 PM   Result Value Ref Range    aPTT 28.0 22.1 - 32.5 sec    aPTT, therapeutic range     58.0 - 36.3 SECS   METABOLIC PANEL, COMPREHENSIVE    Collection Time: 04/23/17 12:07 PM   Result Value Ref Range    Sodium 141 136 - 145 mmol/L    Potassium 5.1 3.5 - 5.1 mmol/L    Chloride 108 97 - 108 mmol/L    CO2 24 21 - 32 mmol/L    Anion gap 9 5 - 15 mmol/L    Glucose 94 65 - 100 mg/dL    BUN 38 (H) 6 - 20 MG/DL    Creatinine 1.29 (H) 0.55 - 1.02 MG/DL    BUN/Creatinine ratio 29 (H) 12 - 20      GFR est AA 48 (L) >60 ml/min/1.73m2    GFR est non-AA 39 (L) >60 ml/min/1.73m2    Calcium 8.8 8.5 - 10.1 MG/DL    Bilirubin, total 1.3 (H) 0.2 - 1.0 MG/DL    ALT (SGPT) 16 12 - 78 U/L    AST (SGOT) 23 15 - 37 U/L    Alk.  phosphatase 61 45 - 117 U/L    Protein, total 6.5 6.4 - 8.2 g/dL    Albumin 3.3 (L) 3.5 - 5.0 g/dL    Globulin 3.2 2.0 - 4.0 g/dL    A-G Ratio 1.0 (L) 1.1 - 2.2     PRO-BNP Collection Time: 04/23/17 12:07 PM   Result Value Ref Range    NT pro-BNP 55332 (H) 0 - 450 PG/ML   CK W/ REFLX CKMB    Collection Time: 04/23/17 12:07 PM   Result Value Ref Range    CK 92 26 - 192 U/L   TROPONIN I    Collection Time: 04/23/17 12:07 PM   Result Value Ref Range    Troponin-I, Qt. <0.04 <0.05 ng/mL   URINALYSIS W/MICROSCOPIC    Collection Time: 04/23/17  1:05 PM   Result Value Ref Range    Color YELLOW/STRAW      Appearance CLEAR CLEAR      Specific gravity 1.025 1.003 - 1.030      pH (UA) 6.0 5.0 - 8.0      Protein TRACE (A) NEG mg/dL    Glucose NEGATIVE  NEG mg/dL    Ketone 15 (A) NEG mg/dL    Bilirubin NEGATIVE  NEG      Blood LARGE (A) NEG      Urobilinogen 0.2 0.2 - 1.0 EU/dL    Nitrites NEGATIVE  NEG      Leukocyte Esterase NEGATIVE  NEG      WBC 0-4 0 - 4 /hpf    RBC 20-50 0 - 5 /hpf    Epithelial cells FEW FEW /lpf    Bacteria NEGATIVE  NEG /hpf    Amorphous Crystals FEW (A) NEG     GLUCOSE, POC    Collection Time: 04/23/17 10:18 PM   Result Value Ref Range    Glucose (POC) 101 (H) 65 - 100 mg/dL    Performed by Paola Kelly    METABOLIC PANEL, BASIC    Collection Time: 04/24/17  4:19 AM   Result Value Ref Range    Sodium 143 136 - 145 mmol/L    Potassium 4.7 3.5 - 5.1 mmol/L    Chloride 109 (H) 97 - 108 mmol/L    CO2 21 21 - 32 mmol/L    Anion gap 13 5 - 15 mmol/L    Glucose 81 65 - 100 mg/dL    BUN 36 (H) 6 - 20 MG/DL    Creatinine 1.29 (H) 0.55 - 1.02 MG/DL    BUN/Creatinine ratio 28 (H) 12 - 20      GFR est AA 48 (L) >60 ml/min/1.73m2    GFR est non-AA 39 (L) >60 ml/min/1.73m2    Calcium 8.7 8.5 - 10.1 MG/DL   CBC W/O DIFF    Collection Time: 04/24/17  4:19 AM   Result Value Ref Range    WBC 3.8 3.6 - 11.0 K/uL    RBC 4.40 3.80 - 5.20 M/uL    HGB 13.0 11.5 - 16.0 g/dL    HCT 41.7 35.0 - 47.0 %    MCV 94.8 80.0 - 99.0 FL    MCH 29.5 26.0 - 34.0 PG    MCHC 31.2 30.0 - 36.5 g/dL    RDW 15.4 (H) 11.5 - 14.5 %    PLATELET 209 (L) 133 - 400 K/uL   HEMOGLOBIN A1C WITH EAG    Collection Time: 04/24/17  4:19 AM   Result Value Ref Range    Hemoglobin A1c 5.6 4.2 - 6.3 %    Est. average glucose 114 mg/dL   LIPID PANEL    Collection Time: 04/24/17  4:19 AM   Result Value Ref Range    LIPID PROFILE          Cholesterol, total 89 <200 MG/DL    Triglyceride 86 <150 MG/DL    HDL Cholesterol 38 MG/DL    LDL, calculated 33.8 0 - 100 MG/DL    VLDL, calculated 17.2 MG/DL    CHOL/HDL Ratio 2.3 0 - 5.0     T4, FREE    Collection Time: 04/24/17  4:19 AM   Result Value Ref Range    T4, Free 1.5 0.8 - 1.5 NG/DL   TSH 3RD GENERATION    Collection Time: 04/24/17  4:19 AM   Result Value Ref Range    TSH 1.36 0.36 - 3.74 uIU/mL   GLUCOSE, POC    Collection Time: 04/24/17  7:33 AM   Result Value Ref Range    Glucose (POC) 81 65 - 100 mg/dL    Performed by Kelton Monge

## 2017-04-24 NOTE — PROGRESS NOTES
Bedside and Verbal shift change report given to [facility]. RN (oncoming nurse) by Alli Kessler RN (offgoing nurse). Report included the following information SBAR, Kardex, Intake/Output and MAR.

## 2017-04-24 NOTE — PROGRESS NOTES
Hospitalist Progress Note    NAME: William John   :  1933   MRN:  [id number]       Interim Hospital Summary: 80 y.o. female whom presented on 2017 with      Assessment / Plan:  Acute hypoxic respiratory failure secondary to   Acute on chronic diastolic heart failure, Severe Pulmonary hypertension  Moderate AS  History of PE in 2016 on eliquis    Iv diuresis, i/o monitoring and daily weights  Echo 2016 EF 60% with moderate AS, severe pulmonary hypertension  Non complaint with lasix  -CXR- No acute cardiopulmonary disease radiographically. Minimal bibasilar  atelectasis or scar.  -supplemental O2 to maintain sat >90%. Wean as tolerated    -Check A1C, LDL, TSH  -continue statin, BB, ACEi. On eliquis. Not on ASA. -Start 60mg IV lasix bid. Monitor UOP and BMP. Adjust diuretic as needed.  -check repeat echo  -Follows with cardiology, Dr. Kunal Luis, consult while inpatient.     DM II  -last A1C 5.6,  PTA lantus 35 units  Blood sugars 101, 81, 108    Will stop the lantus for now, poc and sliding scale  Need to see whether he needs to be on lantus before discharge     Hx of PE/DVT  -prior PE may of 2016  -continue on pta eliquis . defer to PCP regarding the eliquis ( as not sure of the plan regarding anticoagulation for life time ? ??)     HTN  -pta lisinopril and toprol  -prn IV hydralazine      HLD-pta statin      CKD 3-stable, monitor    ZEE  Depression-pta lexapro     Obesity, BMI 39     Incidental CT head finding \"Upward convexity of the pituitary gland which is unusual in this age group but nonspecific. \"  -no obvious endocrine symptoms  -check Thyroid studies and prolactin level ordered on admission           Code Status: full  Surrogate Decision Maker: son  DVT Prophylaxis: eliquis  GI Prophylaxis: not indicated  Baseline: lives at home, uses walker         Subjective:     Chief Complaint / Reason for Physician Visit  i decided to cut back on lasix  Discussed with RN events overnight.      Review of Systems:  Symptom Y/N Comments  Symptom Y/N Comments   Fever/Chills n   Chest Pain n    Poor Appetite    Edema     Cough n   Abdominal Pain n    Sputum    Joint Pain     SOB/VALDOVINOS y   Pruritis/Rash     Nausea/vomit    Tolerating PT/OT     Diarrhea n   Tolerating Diet     Constipation    Other       Could NOT obtain due to:      Objective:     VITALS:   Last 24hrs VS reviewed since prior progress note. Most recent are:  Patient Vitals for the past 24 hrs:   Temp Pulse Resp BP SpO2   04/24/17 0730 97.8 °F (36.6 °C) 63 22 134/53 90 %   04/24/17 0357 98 °F (36.7 °C) (!) 57 20 126/61 91 %   04/24/17 0035 98.1 °F (36.7 °C) (!) 53 20 119/56 97 %   04/23/17 2014 98.5 °F (36.9 °C) (!) 57 20 136/58 100 %   04/23/17 1800 - 70 22 123/51 98 %   04/23/17 1730 - 67 20 117/57 96 %   04/23/17 1700 - 71 21 138/88 95 %   04/23/17 1600 - 68 23 117/74 99 %   04/23/17 1530 - 69 29 131/69 99 %   04/23/17 1430 - 62 16 140/71 99 %   04/23/17 1400 - (!) 56 20 (!) 149/93 99 %   04/23/17 1300 - - - (!) 144/114 96 %   04/23/17 1200 - - - 143/48 96 %   04/23/17 1130 - - - 157/80 94 %   04/23/17 1129 - - - - 95 %   04/23/17 1128 - - - - (!) 89 %   04/23/17 1117 97.4 °F (36.3 °C) 64 24 179/76 (!) 89 %     No intake or output data in the 24 hours ending 04/24/17 1058     PHYSICAL EXAM:  General: WD, WN. Alert,   cooperative, no acute distress    EENT:  EOMI. Anicteric sclerae. MMM  Resp:  B/l air entry, + crackles  No accessory muscle use  CV:  Regular  rhythm,  No edema  GI:  Soft, Non distended, Non tender.  +Bowel sounds  Neurologic:  Alert and oriented X 3, normal speech,   Psych:   Good insight. Not anxious nor agitated  Skin:  No rashes.   No jaundice    Reviewed most current lab test results and cultures  YES  Reviewed most current radiology test results   YES  Review and summation of old records today    NO  Reviewed patient's current orders and MAR    YES  PMH/SH reviewed - no change compared to H&P  ________________________________________________________________________  Care Plan discussed with:    Comments   Patient y    Family      RN y    Care Manager     Consultant                        Multidiciplinary team rounds were held today with , nursing, pharmacist and clinical coordinator. Patient's plan of care was discussed; medications were reviewed and discharge planning was addressed. ________________________________________________________________________  Total NON critical care TIME: 35 Minutes  **   Total CRITICAL CARE TIME Spent:   Minutes non procedure based      Comments   >50% of visit spent in counseling and coordination of care     ________________________________________________________________________  George Mcneal MD     Procedures: see electronic medical records for all procedures/Xrays and details which were not copied into this note but were reviewed prior to creation of Plan. LABS:  I reviewed today's most current labs and imaging studies.   Pertinent labs include:  Recent Labs      04/24/17   0419  04/23/17   1207   WBC  3.8  4.5   HGB  13.0  14.4   HCT  41.7  45.3   PLT  132*  137*     Recent Labs      04/24/17   0419  04/23/17   1207   NA  143  141   K  4.7  5.1   CL  109*  108   CO2  21  24   GLU  81  94   BUN  36*  38*   CREA  1.29*  1.29*   CA  8.7  8.8   ALB   --   3.3*   TBILI   --   1.3*   SGOT   --   23   ALT   --   16   INR   --   1.3*       Signed: George Mcneal MD

## 2017-04-24 NOTE — PROGRESS NOTES
Met with patient for CM initial assessment and to discuss discharge planning needs. Confirmed contact information and emergency contact information. Pt lives in single story home with her  has 3 steps at entrance and ramp attached. pt  has private pay personal care. Pt owns RW and shower bench. Pt son lives closer to pt home. Pt son will transport pt to follow-up appointments. Patient states she had SNF services in the past. Patients son will provide transportation at discharge. CM will continuously assist pt regarding discharge plan. 2.30 PM    CM met pt and gave SNF list.[facility] however she will talk to her son and make final dicission. CM provided contact no and asked pt to contact CM before 4.30 PM.Pt need 3 night inpatient stay in order to qualify for SNF. Care Management Interventions  PCP Verified by CM: Yes (not seen by PCP for last 6 months.)  Mode of Transport at Discharge: Self (pt son will transport)  Transition of Care Consult (CM Consult): SNF (2016 pt had SNF placement at [facility] for 10 weeks.)  Partner SNF: Yes  Discharge Durable Medical Equipment:  (pt has RW and showe chair.)  Health Maintenance Reviewed: Yes  Physical Therapy Consult: Yes  Occupational Therapy Consult: Yes  Current Support Network: Lives with Spouse (single story home has 3 steps at entrance and ramp attached.  pt  has private pay personal care)  Confirm Follow Up Transport: Family  Plan discussed with Pt/Family/Caregiver: Yes  Discharge Location  Discharge Placement: Skilled nursing facility    [place]  Ext [phone]

## 2017-04-24 NOTE — PROGRESS NOTES
Pharmacy Monitoring for Apixaban    Pharmacy review for this 80 y.o. female ordered Apixaban for pulmonary embolism  Major Interacting Medications: none  Platelet Inhibitors: none    Recent Labs      04/23/17   1207   INR  1.3*   APTT  28.0   HGB  14.4   PLT  137*   ALB  3.3*   SGOT  23   TBILI  1.3*   CREA  1.29*   BUN  38*       Child Rivas Score, if applicable (Avoid if level C): n/a      Impression/Plan: pt doesn't know her home dose and Dr. Phuong Garland has requested this be investigated. I called [facility] and the last time she had it filled was Oct 2016 for 5 mg po BID (90 day) supply. Tried to reach nurse and patient-no answer with either. Her recommended dose would depend upon how long it has been since her DVT, but I am not sure she is taking it at all. Per Rx Query, her last fill was in October 2016 by [facility]. Dr. Phuong Garland ordered 2.5 mg po BID.   Will need to investigate further tomorrow       Thanks    Susan Skelton, MISAELD

## 2017-04-24 NOTE — CDMP QUERY
1.  The diagnosis of acute respiratory failure has been documented for your patient. Currently, the documentation does not meet criteria for this diagnosis, and may be challenged by an external reviewer. Please remember to include the clinical indicators to support this diagnosis. Current Documentation: respiratory rate 24, room air oxygen saturation 89, H and P notes sob, and kuo, no accessory muscle use. Criteria:    1. Difficulty breathing: SOB, dyspnea, tachypnea (RR >/= 28), respiratory distress, cyanosis, use of accessory muscles, air hunger, inability to speak in full sentences    AND    2. Blood gas abnormality                  pO2 < 60 or SpO2 </= 88%  while on room air                  pCO2 > 50 & usually pH < 7.35                 pO2 < 70 or SpO2 < 92% while on supplemental O2                 pO2 < 80 or SpO2 < 95% with FiO2 > 32%                  Requiring more than 40% FiO2, regardless of pO2/SpO2    Thanks for your time.     Rachelle Armstrong RN, BSN  [phone]

## 2017-04-24 NOTE — PROGRESS NOTES
Problem: Mobility Impaired (Adult and Pediatric)  Goal: *Acute Goals and Plan of Care (Insert Text)  Physical Therapy Goals  Initiated 4/24/2017  1. Patient will move from supine to sit and sit to supine in bed with moderate assistance within 7 day(s). 2. Patient will transfer from bed to chair and chair to bed with minimal assistance/contact guard assist using the least restrictive device within 7 day(s). 3. Patient will perform sit to stand with moderate assistance within 7 day(s). 4. Patient will ambulate with minimal assistance/contact guard assist for 20 feet with the least restrictive device within 7 day(s). All while maintaining oxygen saturation >90%. PHYSICAL THERAPY EVALUATION  Patient: Carlyle Strickland (89 y.o. female)  Date: 4/24/2017  Primary Diagnosis: Respiratory failure with hypoxia (HCC)  Acute on chronic diastolic (congestive) heart failure (HCC)        Precautions:   Fall      ASSESSMENT :  Based on the objective data described below, the patient presents with significant weakness, decreased activity tolerance, hypoxia, impaired balance and altered gait. PTA pt was living at home with her  who has dementia. She was using a RW and not on supplemental oxygen. Cleared by nursing to mobilize. She was found on RA and oxygen saturation 84% at rest. Oxygen saturation remained in the mid to upper 80s on RA with activity. She required max A of 1-2 for all mobility. Able to come to sitting EOB with good sitting balance. Required multiple attempts to stand and needed total A to shift her weight forward with RW in place to maintain standing balance. Ambulated to bedside chair with short shuffling steps. Oxygen saturation 91% on 2L with activity. She was left sitting up in the chair on 2L. Dicussed with nursing and encouraged utilizing mobility team to transfer back to bed. Recommending SNF at discharge.       Patient will benefit from skilled intervention to address the above impairments. Patients rehabilitation potential is considered to be Fair  Factors which may influence rehabilitation potential include:   [ ]         None noted  [ ]         Mental ability/status  [X]         Medical condition  [X]         Home/family situation and support systems  [ ]         Safety awareness  [ ]         Pain tolerance/management  [ ]         Other:        PLAN :  Recommendations and Planned Interventions:  [X]           Bed Mobility Training             [ ]    Neuromuscular Re-Education  [X]           Transfer Training                   [ ]    Orthotic/Prosthetic Training  [X]           Gait Training                         [ ]    Modalities  [X]           Therapeutic Exercises           [ ]    Edema Management/Control  [X]           Therapeutic Activities            [X]    Patient and Family Training/Education  [ ]           Other (comment):     Frequency/Duration: Patient will be followed by physical therapy  4 times a week to address goals. Discharge Recommendations: Bashir Samuel  Further Equipment Recommendations for Discharge: TBD       SUBJECTIVE:   Patient stated I left rehab in July.       OBJECTIVE DATA SUMMARY:   HISTORY:    Past Medical History:   Diagnosis Date    CAD (coronary artery disease)       MI in 07    Diabetes ([facility] [address]      Hypertension      Other ill-defined conditions       Sleep apnea    Sleep disorder       Past Surgical History:   Procedure Laterality Date    HX APPENDECTOMY        HX CHOLECYSTECTOMY        HX HEENT         Tonsilectomy    HX ORTHOPAEDIC         Left knee replacement    HX ORTHOPAEDIC         Elbow     Prior Level of Function/Home Situation: pt lives at home with her  who had dementia. She uses a RW and had difficulty with mobility and ADLs at baseline. She has a girl come in 2x a week to help her with a shower. She reports multiple falls and the need for fire and rescue to get up.    Personal factors and/or comorbidities impacting plan of care:      Home Situation  Home Environment: Private residence  # Steps to Enter: 3  Wheelchair Ramp: Yes  One/Two Story Residence: One story  Living Alone: No  Support Systems: Spouse/Significant Other/Partner, Friends \ neighbors, Family member(s)  Patient Expects to be Discharged to[de-identified] Unknown  Current DME Used/Available at Home: CPAP, Grab bars, Hospital bed, Walker, rolling, Adaptive dressing aides, Commode, bedside, Shower chair  Tub or Shower Type: Shower     EXAMINATION/PRESENTATION/DECISION MAKING:   Critical Behavior:              Hearing: Auditory  Auditory Impairment: None  Skin:  WDL  Edema: LEs  Range Of Motion:  AROM: Generally decreased, functional           PROM: Generally decreased, functional           Strength:    Strength: Generally decreased, functional                    Tone & Sensation:   Tone: Normal                              Coordination:  Coordination: Generally decreased, functional  Vision:      Functional Mobility:  Bed Mobility:  Rolling: Maximum assistance  Supine to Sit: Maximum assistance;Assist x2        Transfers:  Sit to Stand: Maximum assistance;Assist x2  Stand to Sit: Moderate assistance                       Balance:   Sitting: Intact  Standing: Impaired  Standing - Static: Poor  Standing - Dynamic : Poor  Ambulation/Gait Training:  Distance (ft): 5 Feet (ft)  Assistive Device: Gait belt;Walker, rolling  Ambulation - Level of Assistance: Minimal assistance        Gait Abnormalities: Decreased step clearance;Shuffling gait        Base of Support: Widened     Speed/Irene: Pace decreased (<100 feet/min); Shuffled  Step Length: Left shortened;Right shortened                    Functional Measure:  Barthel Index:      Bathin  Bladder: 5  Bowels: 5  Groomin  Dressin  Feedin  Mobility: 0  Stairs: 0  Toilet Use: 5  Transfer (Bed to Chair and Back): 5  Total: 30         Barthel and G-code impairment scale:  Percentage of impairment CH  0% CI  1-19% CJ  20-39% CK  40-59% CL  60-79% CM  80-99% CN  100%   Barthel Score 0-100 100 99-80 79-60 59-40 20-39 1-19    0   Barthel Score 0-20 20 17-19 13-16 9-12 5-8 1-4 0      The Barthel ADL Index: Guidelines  1. The index should be used as a record of what a patient does, not as a record of what a patient could do. 2. The main aim is to establish degree of independence from any help, physical or verbal, however minor and for whatever reason. 3. The need for supervision renders the patient not independent. 4. A patient's performance should be established using the best available evidence. Asking the patient, friends/relatives and nurses are the usual sources, but direct observation and common sense are also important. However direct testing is not needed. 5. Usually the patient's performance over the preceding 24-48 hours is important, but occasionally longer periods will be relevant. 6. Middle categories imply that the patient supplies over 50 per cent of the effort. 7. Use of aids to be independent is allowed. Noelle Alexandra., Barthel, D.W. (0522). Functional evaluation: the Barthel Index. [address] [facility] (14)2. Shereen Lei pato GARRICK Loya, Lula Ness., Wilfredo [facility], [address] [place] [address] (1999). Measuring the change indisability after inpatient rehabilitation; comparison of the responsiveness of the Barthel Index and Functional Taswell Measure. Journal of Neurology, Neurosurgery, and Psychiatry, 66(4), 816-106. JOVI Valente.MALCOLM, MARIA LUZ Page, & Calli Leon MMAG. (2004.) Assessment of post-stroke quality of life in cost-effectiveness studies: The usefulness of the Barthel Index and the EuroQoL-5D. Quality of Life Research, 13, 310-60            G codes: In compliance with CMSs Claims Based Outcome Reporting, the following G-code set was chosen for this patient based on their primary functional limitation being treated:      The outcome measure chosen to determine the severity of the functional limitation was the barthel with a score of 30/100 which was correlated with the impairment scale. · Mobility - Walking and Moving Around:               - CURRENT STATUS:    CL - 60%-79% impaired, limited or restricted               - GOAL STATUS:           CK - 40%-59% impaired, limited or restricted               - D/C STATUS:                       ---------------To be determined---------------      Physical Therapy Evaluation Charge Determination   History Examination Presentation Decision-Making   HIGH Complexity :3+ comorbidities / personal factors will impact the outcome/ POC  HIGH Complexity : 4+ Standardized tests and measures addressing body structure, function, activity limitation and / or participation in recreation  MEDIUM Complexity : Evolving with changing characteristics  MEDIUM Complexity : FOTO score of 26-74      Based on the above components, the patient evaluation is determined to be of the following complexity level: MEDIUM     Pain:                    Activity Tolerance:   Fair, hypoxic   Please refer to the flowsheet for vital signs taken during this treatment. After treatment:   [X]         Patient left in no apparent distress sitting up in chair  [ ]         Patient left in no apparent distress in bed  [X]         Call bell left within reach  [X]         Nursing notified  [ ]         Caregiver present  [ ]         Bed alarm activated      COMMUNICATION/EDUCATION:   The patients plan of care was discussed with: Occupational Therapist and Registered Nurse.  [X]         Fall prevention education was provided and the patient/caregiver indicated understanding. [ ]         Patient/family have participated as able in goal setting and plan of care. [X]         Patient/family agree to work toward stated goals and plan of care. [ ]         Patient understands intent and goals of therapy, but is neutral about his/her participation.   [ ]         Patient is unable to participate in goal setting and plan of care.      Thank you for this referral.  Mary Mederos, PT, DPT   Time Calculation: 20 mins

## 2017-04-24 NOTE — WOUND CARE
Pressure Ulcer Prevention In basket Alert Received for Markus < 14 (moderate risk).      Suggested Care Plan/Interventions for Nursing  1. Complete Markus Pressure Ulcer Risk Scale and use sub scores to identify appropriate interventions. 2. Perform Assessment: skin, changes in LOC, visual cues for pain, monitor skin under medical devices  3. Respond to Reduced Sensory Perception: changes in LOC, check visual cues for pain, float heels, suspension boots, pressure redistribution bed/mattress/chair cushion, turning and reposition approximately every 2 hours (pillows & wedges), pad between skin to skin, turn & reposition  4. Manage Moisture: absorbent under pads, internal / external urinary device, internal /  external fecal device, minimize layers, contain wound drainage, access need for specialty bed, limit adult briefs, maintain skin hydration (lotion/cream), moisture barrier, offer toileting every hour  5. Promote Activity: increase time out of bed, chair cushion, PT/OT evaluation, trapeze to reposition, pressure redistribution bed/mattress/chair  6. Address Reduced Mobility: float heels / suspension boot, HOB 30 degrees or less, pressure redistribution bed/mattress/cushion, PT / OT evaluation, turn and reposition approximately every 2 hours (pillows & wedges)  7. Promote Nutrition: document food / fluid / supplement intake, encourage/assist with meals as needed  8. Reduce Friction and Shear: transferring/repositioning devices (lift/draw sheet), lift team/ patient mobility team, feet elevated on foot rest, minimize layers, foam dressing / transparent film / skin sealants, protective barrier creams and emollients, transfer aides (board, Elizabeth lift, ceiling lift, stand assist), HOB 30 degrees or less, trapeze to reposition.   Wound Care Team

## 2017-04-24 NOTE — CARDIO/PULMONARY
C/P Rehab Note:    Chart Reviewed. Admitting diagnosis of Acute Respiratory Failure and Acute Diastolic HF. H&P note reflects she has not been taking her Lasix as prescribed. PMH significant for:  -HTN  -DM  -CKD stage 3  -ZEE on CPAP  -Obesity  ,  with dementia, uses a walker. Pt is a non smoker. Pt received teaching from our department last on 5/16. Met with pt who was lying in bed. Reviewed role of Cardiac Rehab Nurse. Patient  given printed teaching materials on CHF and low NA diet. Instruction given on s/s of CHF, checking weight every am and calling MD if weight is up 2-3 lbs in a day or 5 lbs in a week, fluid/Na restrictions, s/s of worsening CHF and when to call MD. Reviewed activity as tolerated with frequent rest periods as needed, taking medications as prescribed, and the importance of follow up visits with physician. Admission Med Rec provided and copy given. Discussed the importance of taking her Lasix daily. Encouraged pt to use Depends to reduce concern to getting to bathroom in a timely manner. She advised me that she does wear Attends. Pt reports she had been checking her weight daily but got out of the practice. Reviewed rational for daily weights and encouraged to begin once discharged. Pt reports she has decrease intake of process foods. Pt without questions but reinforcement recommended.

## 2017-04-24 NOTE — PROGRESS NOTES
Bedside interdisciplinary rounds were held today to discuss patient plan of care and outcomes. The following members were present: Physician, Nurse, Clinical Care Leader, Pharmacy, Physical Therapy, and Case Management. Plan: Continue present treatment.

## 2017-04-25 LAB
ANION GAP BLD CALC-SCNC: 9 MMOL/L (ref 5–15)
BNP SERPL-MCNC: 472 PG/ML (ref 0–100)
BUN SERPL-MCNC: 39 MG/DL (ref 6–20)
BUN/CREAT SERPL: 26 (ref 12–20)
CALCIUM SERPL-MCNC: 8.5 MG/DL (ref 8.5–10.1)
CHLORIDE SERPL-SCNC: 106 MMOL/L (ref 97–108)
CO2 SERPL-SCNC: 26 MMOL/L (ref 21–32)
CREAT SERPL-MCNC: 1.51 MG/DL (ref 0.55–1.02)
ERYTHROCYTE [DISTWIDTH] IN BLOOD BY AUTOMATED COUNT: 15.3 % (ref 11.5–14.5)
GLUCOSE BLD STRIP.AUTO-MCNC: 101 MG/DL (ref 65–100)
GLUCOSE BLD STRIP.AUTO-MCNC: 159 MG/DL (ref 65–100)
GLUCOSE BLD STRIP.AUTO-MCNC: 179 MG/DL (ref 65–100)
GLUCOSE BLD STRIP.AUTO-MCNC: 50 MG/DL (ref 65–100)
GLUCOSE BLD STRIP.AUTO-MCNC: 68 MG/DL (ref 65–100)
GLUCOSE BLD STRIP.AUTO-MCNC: 71 MG/DL (ref 65–100)
GLUCOSE BLD STRIP.AUTO-MCNC: 92 MG/DL (ref 65–100)
GLUCOSE BLD STRIP.AUTO-MCNC: 92 MG/DL (ref 65–100)
GLUCOSE SERPL-MCNC: 43 MG/DL (ref 65–100)
HCT VFR BLD AUTO: 41.4 % (ref 35–47)
HGB BLD-MCNC: 12.8 G/DL (ref 11.5–16)
MCH RBC QN AUTO: 29.6 PG (ref 26–34)
MCHC RBC AUTO-ENTMCNC: 30.9 G/DL (ref 30–36.5)
MCV RBC AUTO: 95.8 FL (ref 80–99)
PLATELET # BLD AUTO: 149 K/UL (ref 150–400)
POTASSIUM SERPL-SCNC: 4.8 MMOL/L (ref 3.5–5.1)
RBC # BLD AUTO: 4.32 M/UL (ref 3.8–5.2)
SERVICE CMNT-IMP: ABNORMAL
SERVICE CMNT-IMP: NORMAL
SODIUM SERPL-SCNC: 141 MMOL/L (ref 136–145)
WBC # BLD AUTO: 4.1 K/UL (ref 3.6–11)

## 2017-04-25 PROCEDURE — 85027 COMPLETE CBC AUTOMATED: CPT | Performed by: INTERNAL MEDICINE

## 2017-04-25 PROCEDURE — 93306 TTE W/DOPPLER COMPLETE: CPT

## 2017-04-25 PROCEDURE — 74011636637 HC RX REV CODE- 636/637: Performed by: INTERNAL MEDICINE

## 2017-04-25 PROCEDURE — 77030009526 HC GEL CARSYN MDII -A

## 2017-04-25 PROCEDURE — 83880 ASSAY OF NATRIURETIC PEPTIDE: CPT | Performed by: INTERNAL MEDICINE

## 2017-04-25 PROCEDURE — 80048 BASIC METABOLIC PNL TOTAL CA: CPT | Performed by: INTERNAL MEDICINE

## 2017-04-25 PROCEDURE — 97110 THERAPEUTIC EXERCISES: CPT | Performed by: PHYSICAL THERAPIST

## 2017-04-25 PROCEDURE — 82962 GLUCOSE BLOOD TEST: CPT

## 2017-04-25 PROCEDURE — 74011250637 HC RX REV CODE- 250/637: Performed by: INTERNAL MEDICINE

## 2017-04-25 PROCEDURE — 77010033678 HC OXYGEN DAILY

## 2017-04-25 PROCEDURE — 74011000250 HC RX REV CODE- 250: Performed by: INTERNAL MEDICINE

## 2017-04-25 PROCEDURE — 97116 GAIT TRAINING THERAPY: CPT | Performed by: PHYSICAL THERAPIST

## 2017-04-25 PROCEDURE — 36415 COLL VENOUS BLD VENIPUNCTURE: CPT | Performed by: INTERNAL MEDICINE

## 2017-04-25 PROCEDURE — 65270000029 HC RM PRIVATE

## 2017-04-25 RX ORDER — FUROSEMIDE 10 MG/ML
40 INJECTION INTRAMUSCULAR; INTRAVENOUS 2 TIMES DAILY
Status: DISCONTINUED | OUTPATIENT
Start: 2017-04-26 | End: 2017-04-26

## 2017-04-25 RX ORDER — SODIUM CHLORIDE 0.9 % (FLUSH) 0.9 %
SYRINGE (ML) INJECTION
Status: DISCONTINUED
Start: 2017-04-25 | End: 2017-04-27 | Stop reason: HOSPADM

## 2017-04-25 RX ORDER — METOPROLOL SUCCINATE 25 MG/1
25 TABLET, EXTENDED RELEASE ORAL DAILY
Status: DISCONTINUED | OUTPATIENT
Start: 2017-04-26 | End: 2017-04-27 | Stop reason: HOSPADM

## 2017-04-25 RX ORDER — ESCITALOPRAM OXALATE 10 MG/1
15 TABLET ORAL DAILY
Status: DISCONTINUED | OUTPATIENT
Start: 2017-04-26 | End: 2017-04-27 | Stop reason: HOSPADM

## 2017-04-25 RX ADMIN — ESCITALOPRAM OXALATE 10 MG: 10 TABLET ORAL at 09:34

## 2017-04-25 RX ADMIN — DEXTROSE MONOHYDRATE 25 G: 25 INJECTION, SOLUTION INTRAVENOUS at 05:54

## 2017-04-25 RX ADMIN — OXYBUTYNIN CHLORIDE 5 MG: 5 TABLET, FILM COATED, EXTENDED RELEASE ORAL at 09:34

## 2017-04-25 RX ADMIN — INSULIN LISPRO 2 UNITS: 100 INJECTION, SOLUTION INTRAVENOUS; SUBCUTANEOUS at 16:47

## 2017-04-25 RX ADMIN — Medication 10 ML: at 22:28

## 2017-04-25 RX ADMIN — APIXABAN 2.5 MG: 2.5 TABLET, FILM COATED ORAL at 22:28

## 2017-04-25 RX ADMIN — PRAVASTATIN SODIUM 40 MG: 40 TABLET ORAL at 22:28

## 2017-04-25 RX ADMIN — APIXABAN 2.5 MG: 2.5 TABLET, FILM COATED ORAL at 09:34

## 2017-04-25 RX ADMIN — Medication 10 ML: at 16:47

## 2017-04-25 NOTE — WOUND CARE
WOUND CARE CONSULT:  Consult by nurse request for POA sacral/coccyx pressure injury. Chart reviewed, patient assessed. Admitted for acute hypoxic respiratory failure, acute on chronic diastolic heart failure, severe pulmonary HTN, with a history of diabetes, PE/DVT, HTN, high cholesterol, CKD stage 3, sleep apnea, and depression. Patient has a POA stage III sacral/coccyx pressure injury. She states she had a pilonidal cyst in the past and had it surgically removed; this wound is in the area of the scar formation. The wound measures 1 cm x 0.2 cm x 0.3, it is beefy red, polo-wound is macerated and the wound needs daily packing. Spoke with Dr. Romana Case, notified her of POA pressure injury and orders obtained. This patient was seen by this nurse on 4/5/16 and at that time she had a stage II pressure injury that measured 2.5 x 2 x 0.1. The wound has gotten smaller but deeper.     Assessment and treatment are as follows:    WOUND POA CONDITIONS        Wound Sacral/coccyx Posterior (Active)   DRESSING STATUS Removed 4/25/2017 12:03 PM   DRESSING TYPE Foam 4/25/2017 12:03 PM   Pressure Injury Stage lll 4/25/2017 12:03 PM   Wound Length (cm) 1 cm 4/25/2017 12:03 PM   Wound Width (cm) 0.2 cm 4/25/2017 12:03 PM   Wound Depth (cm) 0.3 4/25/2017 12:03 PM   Wound Surface area (cm^3) 0.06 cm^2 4/25/2017 12:03 PM   Condition of [place] 4/25/2017 12:03 PM   Condition of Edges Open 4/25/2017 12:03 PM   Tissue Type Red 4/25/2017 12:03 PM   Tissue Type Percent Red 100 4/25/2017 12:03 PM   Drainage Amount  Scant 4/25/2017 12:03 PM   Drainage Color Serous 4/25/2017 12:03 PM   Wound Odor None 4/25/2017 12:03 PM   Periwound Skin Condition Macerated 4/25/2017 12:03 PM   Cleansing and Cleansing Agents  Dermal wound cleanser 4/25/2017 12:03 PM   Dressing Type Applied Packing;Zinc based paste;Gauze 4/25/2017 12:03 PM   Procedure Tolerated Well 4/25/2017 12:03 PM   Number of days:2           Recommendation:    Turn every 2 hours from left to right, keep off back at all times. Float heels at all times    Sacral/coccyx- Daily cleanse with Carraklenz, wipe wound bed clean and dry, apply zinc oxide to polo-wound, impregnate a piece of 1/4\" packing strip with Carrasyn gel and loosely fill wound, cover with a 4 x 4 that has been folded to fit the wound and secure with tape.     Nikole Dallas RN, BSN, Wound Care Nurse

## 2017-04-25 NOTE — PROGRESS NOTES
Pt chose [facility] for pt transitional care. CM send referral to [facility]. Pt need tonight stay in order to qualify for SNF. Pt said her son will transport her upon discharge to SNF.     Martina Mahoney  Ext [phone]

## 2017-04-25 NOTE — PROGRESS NOTES
HYPOGLYCEMIC EPISODE DOCUMENTATION    Patient with hypoglycemic episode(s) at 0549(time) on 04/25/2017(date). BG value(s) pre-treatment 48    Was patient symptomatic?  [] yes, [x] no  Patient was treated with the following rescue medications/treatments: [x] D50                [] Glucose tablets                [] Glucagon                [] 4oz juice                [] 6oz reg soda                [] 8oz low fat milk  BG value post-treatment: 159  Once BG treated and value greater than 80mg/dl, pt was provided with the following:  [] snack  [] meal  Name of MD notified:  The following orders were received: Nil

## 2017-04-25 NOTE — DIABETES MGMT
DTC Progress Note    Recommendations/ Comments:Please consider resuming lantus at 15units if BG rises over 180. Pt with hypoglycemia this morning and lantus 35units discontinued. Chart reviewed on [facility]. Patient is a 80 y.o. female with known Type 2 Diabetes on levemir 50units BID at home. A1c:   Lab Results   Component Value Date/Time    Hemoglobin A1c 5.6 04/24/2017 04:19 AM    Hemoglobin A1c 5.9 04/01/2016 03:27 AM       Recent Glucose Results:   Lab Results   Component Value Date/Time    GLU 43 (LL) 04/25/2017 04:06 AM    GLUCPOC 179 (H) 04/25/2017 04:07 PM    GLUCPOC 101 (H) 04/25/2017 11:43 AM    GLUCPOC 92 04/25/2017 09:21 AM        Lab Results   Component Value Date/Time    Creatinine 1.51 04/25/2017 04:06 AM       Active Orders   Diet    DIET DIABETIC CONSISTENT CARB Regular; 2 GM NA (House Low NA); FR 1200ML; AHA-LOW-CHOL FAT        PO intake: No data found. Current hospital DM medication: humalog correction    Will continue to follow as needed.     Thank you  ALPA ZapienN, RN, [facility]

## 2017-04-25 NOTE — ROUTINE PROCESS
Bedside and Verbal shift change report given to [address] [place] [address] (oncoming nurse) by Paramjit Pace (offgoing nurse). Report included the following information SBAR, Kardex, Intake/Output, MAR and Cardiac Rhythm Sinus Danuta Loots.

## 2017-04-25 NOTE — PROGRESS NOTES
Bedside interdisciplinary rounds were held today to discuss patient plan of care and outcomes. The following members were present: Physician, Nurse, Clinical Care Leader, Pharmacy, Physical Therapy, and Case Management. Plan:  Awaiting WC consult for PU (POA). BP/HR borderline - attending aware. Plan for [address] [facility] at [place] - patient needs 1 more midnight.

## 2017-04-25 NOTE — PROGRESS NOTES
Spiritual Care Partner Volunteer visited patient in Ortho on 4/25/17.   Documented by:  Gerald Councilman, MPS, [facility], [address]     Paging Service  [phone])

## 2017-04-25 NOTE — PROGRESS NOTES
Cardiology Progress Note      4/25/2017 6:58 PM    Admit Date: 4/23/2017      Subjective:  Up in chair. Feeling much better. No new c/o          Visit Vitals    /69 (BP 1 Location: Right arm, BP Patient Position: Sitting)    Pulse 62    Temp 98.7 °F (37.1 °C)    Resp 18    Ht 5' 1\" (1.549 m)    Wt 97.9 kg (215 lb 12.8 oz)    SpO2 95%    BMI 40.78 kg/m2              Objective:      Physical Exam:  VS as above  Chest scattered basilar crackles   Card RRR 3/6 BETTY     Data Review:   Labs:    BUN 39  Creat 1.5  Hgb 12.8     Telemetry: tele down at present       Assessment:     1. Acute on chronic diastolic chf- better   2. Hx of cath in past with mild CAD  3. Echo 4/2016 EF 60% with mild to mod AS mean of 18, sev pHTN  4. Bifasicular block  5. HTN  6. DM  7. CKD stage 3  8. Hx of PE in 4/2016  9. Chronic back pain  10. ZEE on CpAP    Plan:  Await echo results.  Cont current Rx

## 2017-04-25 NOTE — PROGRESS NOTES
Problem: Mobility Impaired (Adult and Pediatric)  Goal: *Acute Goals and Plan of Care (Insert Text)  Physical Therapy Goals  Initiated 4/24/2017  1. Patient will move from supine to sit and sit to supine in bed with moderate assistance within 7 day(s). 2. Patient will transfer from bed to chair and chair to bed with minimal assistance/contact guard assist using the least restrictive device within 7 day(s). 3. Patient will perform sit to stand with moderate assistance within 7 day(s). 4. Patient will ambulate with minimal assistance/contact guard assist for 20 feet with the least restrictive device within 7 day(s). All while maintaining oxygen saturation >90%. PHYSICAL THERAPY TREATMENT  SEEN [id number] TO [id number]        Patient: Lorenzo Covarrubias (39 y.o. female)  Date: 4/25/2017  Diagnosis: Respiratory failure with hypoxia (HCC)  Acute on chronic diastolic (congestive) heart failure (HCC) <principal problem not specified>  Precautions: Fall      ASSESSMENT:  Patient seen for exercises, mobility skills, transfers and ambulation. She was agreeable to working with PT. Worked on assisted LE exercises prior to attempt at getting up [address] [place] [address]. Rolled with mod assist x 2, brought legs over side and pushed to sitting at bedside. Good sitting balance at bedside and in the chair. Raised bed slightly to assist with sit to stand to the RW. Able to ambulate 20' with RW and min assist.  Sat down unsafely. Had her get up again and sit safely. Stated that she has a sore place on her bottom. Noted a patch in the sacral area. Got a little winded with brief ambulation. Cleared almost immediately with seated rest.  Will need SNF rehab.    Progression toward goals:  [ ]    Improving appropriately and progressing toward goals  [X]    Improving slowly and progressing toward goals  [ ]    Not making progress toward goals and plan of care will be adjusted       PLAN:  Patient continues to benefit from skilled intervention to address the above impairments. Continue treatment per established plan of care. Discharge Recommendations:  Bashir Jimmie  Further Equipment Recommendations for Discharge:  Defer to SNF rehab       SUBJECTIVE:   Patient stated I have a sore spot on my bottom.       OBJECTIVE DATA SUMMARY:   Critical Behavior:  Neurologic State: Alert  Orientation Level: Oriented X4  Cognition: Follows commands  Safety/Judgement: Awareness of environment, Fall prevention  Functional Mobility Training:  Bed Mobility:  Rolling: Moderate assistance;Assist x2  Supine to Sit: Minimum assistance; Moderate assistance;Assist x2     Transfers:  Sit to Stand: Moderate assistance;Assist x2 (with bed raised)  Stand to Sit: Minimum assistance;Assist x2 (safety cues to reach back for the chair)     Balance:  Sitting: Intact  Standing: Impaired  Standing - Static: Good  Standing - Dynamic : Fair     Ambulation/Gait Training:  Distance (ft): 20 Feet (ft)  Assistive Device: Gait belt;Walker, rolling  Ambulation - Level of Assistance: Minimal assistance  Gait Abnormalities: Decreased step clearance  Base of Support: Widened  Speed/Irene: Pace decreased (<100 feet/min)  Step Length: Left shortened;Right shortened                    Therapeutic Exercises: Toe wiggles, ankle pumps, heel slides, hip internal/external rotation     Pain:  Pain Scale 1: Numeric (0 - 10)  Pain Intensity 1: 0     Activity Tolerance: Tolerated PT treatment session well. Please refer to the flowsheet for vital signs taken during this treatment.   After treatment:   [X]    Patient left in no apparent distress sitting up in chair  [ ]    Patient left in no apparent distress in bed  [X]    Call bell left within reach  [X]    Nursing notified  [ ]    Caregiver present  [ ]    Bed alarm activated (not being used)      COMMUNICATION/COLLABORATION:   The patients plan of care was discussed with: Registered Nurse     Luis Granado, PT  Time Calculation: 30 mins

## 2017-04-25 NOTE — PROGRESS NOTES
Hospitalist Progress Note    NAME: Lorenzo Covarrubias   :  1933   MRN:  [id number]     Assessment / Plan:  Acute hypoxic respiratory failure secondary to   Acute on chronic diastolic heart failure, Severe Pulmonary hypertension, Moderate AS  History of PE in 2016 on eliquis  bradycardia  Iv diuresis, daily weights (up - does not make sense)  Echo 2016 EF 60% with moderate AS, severe pulmonary hypertension  Non complaint with lasix  -CXR- No acute cardiopulmonary disease radiographically. Minimal bibasilar atelectasis or scar.  -supplemental O2 to maintain sat >90%. Wean as tolerated    -A1C (5.6), LDL 34, TSH wnl  -continue statin, ACEi. On eliquis. Not on ASA. Decrease BB  -Started 60mg IV lasix bid. Decrease today as creat rising. Hold afternoon dose.  -check repeat echo  -Follows with cardiology, Dr. Kelsey Andrew, consult while inpatient.     DM II  -last A1C 5.6,  PTA lantus 35 units    Keep off the lantus for now, poc and sliding scale  Need to see whether she needs to be on lantus before discharge     Hx of PE/DVT  -prior PE may of 2016  -continue on pta eliquis . defer to PCP regarding the eliquis ( as not sure of the plan regarding anticoagulation for life time ? ??) pt has not filled this since 2016 and recommended dose is 2.5mg for 6 months BID     HTN  -pta lisinopril and toprol  -prn IV hydralazine      HLD-cont pta statin  CKD 3-stable, monitor    ZEE  Depression-pta lexapro     Obesity, BMI 39   Incidental CT head finding \"Upward convexity of the pituitary gland which is unusual in this age group but nonspecific. \"  -no obvious endocrine symptoms  - Thyroid studies and prolactin level ordered on admission - WNL    Stage 3 poa sacral ulcer       Code Status: full  Surrogate Decision Maker: son  DVT Prophylaxis: eliquis  Baseline: lives at home, uses walker         Subjective:     Chief Complaint / Reason for Physician Visit  Breathing is much better she says. Discussed with RN events overnight. Review of Systems:  Symptom Y/N Comments  Symptom Y/N Comments   Fever/Chills     Chest Pain n    Poor Appetite    Edema     Cough n/y slight  Abdominal Pain n    Sputum n   Joint Pain     SOB/VALDOVINOS y   Pruritis/Rash     Nausea/vomit    Tolerating PT/OT     Diarrhea     Tolerating Diet     Constipation    Other       Could NOT obtain due to:      Objective:     VITALS:   Last 24hrs VS reviewed since prior progress note. Most recent are:  Patient Vitals for the past 24 hrs:   Temp Pulse Resp BP SpO2   04/25/17 1151 98.9 °F (37.2 °C) (!) 55 18 97/56 93 %   04/25/17 0914 - (!) 50 - - -   04/25/17 0900 - (!) 50 - - -   04/25/17 0838 98.6 °F (37 °C) 77 18 108/73 96 %   04/25/17 0330 97.2 °F (36.2 °C) (!) 45 18 109/57 97 %   04/25/17 0014 97.4 °F (36.3 °C) (!) 55 18 110/60 95 %   04/24/17 1951 98.5 °F (36.9 °C) (!) 52 20 114/79 100 %   04/24/17 1618 97.8 °F (36.6 °C) (!) 52 18 115/58 93 %     No intake or output data in the 24 hours ending 04/25/17 1215     PHYSICAL EXAM:  General: WD, WN. Alert,   cooperative, no acute distress    EENT:  EOMI. Anicteric sclerae. MMM  Resp:  R base decreased   air entry, + crackles  No accessory muscle use  CV:  Regular  rhythm,  ++ edema  GI:  Soft, Non distended, Non tender.  +Bowel sounds  Neurologic:  Alert and oriented X 3, normal speech,   Psych:   Good insight. Not anxious nor agitated  Skin:    No jaundice    Reviewed most current lab test results and cultures  YES  Reviewed most current radiology test results   YES  Review and summation of old records today    NO  Reviewed patient's current orders and MAR    YES  PMH/SH reviewed - no change compared to H&P  ________________________________________________________________________  Care Plan discussed with:    Comments   Patient y    Family      RN      Care Manager     Consultant                        Multidiciplinary team rounds were held today with , nursing, pharmacist and clinical coordinator.   Patient's plan of care was discussed; medications were reviewed and discharge planning was addressed. ________________________________________________________________________  Total NON critical care TIME:  Minutes  **   Total CRITICAL CARE TIME Spent:   Minutes non procedure based      Comments   >50% of visit spent in counseling and coordination of care     ________________________________________________________________________  Kameron Plascencia MD     Procedures: see electronic medical records for all procedures/Xrays and details which were not copied into this note but were reviewed prior to creation of Plan. LABS:  I reviewed today's most current labs and imaging studies.   Pertinent labs include:  Recent Labs      04/25/17   0406  04/24/17   0419  04/23/17   1207   WBC  4.1  3.8  4.5   HGB  12.8  13.0  14.4   HCT  41.4  41.7  45.3   PLT  149*  132*  137*     Recent Labs      04/25/17   0406  04/24/17   0419  04/23/17   1207   NA  141  143  141   K  4.8  4.7  5.1   CL  106  109*  108   CO2  26  21  24   GLU  43*  81  94   BUN  39*  36*  38*   CREA  1.51*  1.29*  1.29*   CA  8.5  8.7  8.8   ALB   --    --   3.3*   TBILI   --    --   1.3*   SGOT   --    --   23   ALT   --    --   16   INR   --    --   1.3*       Signed: Kameron Plascencia MD

## 2017-04-26 LAB
ANION GAP BLD CALC-SCNC: 7 MMOL/L (ref 5–15)
ANION GAP BLD CALC-SCNC: 8 MMOL/L (ref 5–15)
BUN SERPL-MCNC: 52 MG/DL (ref 6–20)
BUN SERPL-MCNC: 55 MG/DL (ref 6–20)
BUN/CREAT SERPL: 33 (ref 12–20)
BUN/CREAT SERPL: 34 (ref 12–20)
CALCIUM SERPL-MCNC: 8.4 MG/DL (ref 8.5–10.1)
CALCIUM SERPL-MCNC: 8.6 MG/DL (ref 8.5–10.1)
CHLORIDE SERPL-SCNC: 101 MMOL/L (ref 97–108)
CHLORIDE SERPL-SCNC: 102 MMOL/L (ref 97–108)
CO2 SERPL-SCNC: 28 MMOL/L (ref 21–32)
CO2 SERPL-SCNC: 30 MMOL/L (ref 21–32)
CREAT SERPL-MCNC: 1.52 MG/DL (ref 0.55–1.02)
CREAT SERPL-MCNC: 1.65 MG/DL (ref 0.55–1.02)
ERYTHROCYTE [DISTWIDTH] IN BLOOD BY AUTOMATED COUNT: 15.1 % (ref 11.5–14.5)
GLUCOSE BLD STRIP.AUTO-MCNC: 101 MG/DL (ref 65–100)
GLUCOSE BLD STRIP.AUTO-MCNC: 112 MG/DL (ref 65–100)
GLUCOSE BLD STRIP.AUTO-MCNC: 97 MG/DL (ref 65–100)
GLUCOSE BLD STRIP.AUTO-MCNC: 97 MG/DL (ref 65–100)
GLUCOSE SERPL-MCNC: 101 MG/DL (ref 65–100)
GLUCOSE SERPL-MCNC: 111 MG/DL (ref 65–100)
HCT VFR BLD AUTO: 41.2 % (ref 35–47)
HGB BLD-MCNC: 13 G/DL (ref 11.5–16)
MCH RBC QN AUTO: 30.4 PG (ref 26–34)
MCHC RBC AUTO-ENTMCNC: 31.6 G/DL (ref 30–36.5)
MCV RBC AUTO: 96.3 FL (ref 80–99)
PLATELET # BLD AUTO: 159 K/UL (ref 150–400)
POTASSIUM SERPL-SCNC: 5.7 MMOL/L (ref 3.5–5.1)
POTASSIUM SERPL-SCNC: 5.7 MMOL/L (ref 3.5–5.1)
RBC # BLD AUTO: 4.28 M/UL (ref 3.8–5.2)
SERVICE CMNT-IMP: ABNORMAL
SERVICE CMNT-IMP: ABNORMAL
SERVICE CMNT-IMP: NORMAL
SERVICE CMNT-IMP: NORMAL
SODIUM SERPL-SCNC: 137 MMOL/L (ref 136–145)
SODIUM SERPL-SCNC: 139 MMOL/L (ref 136–145)
WBC # BLD AUTO: 4.8 K/UL (ref 3.6–11)

## 2017-04-26 PROCEDURE — 74011250637 HC RX REV CODE- 250/637: Performed by: INTERNAL MEDICINE

## 2017-04-26 PROCEDURE — 65270000029 HC RM PRIVATE

## 2017-04-26 PROCEDURE — 82962 GLUCOSE BLOOD TEST: CPT

## 2017-04-26 PROCEDURE — 36415 COLL VENOUS BLD VENIPUNCTURE: CPT | Performed by: INTERNAL MEDICINE

## 2017-04-26 PROCEDURE — 97110 THERAPEUTIC EXERCISES: CPT | Performed by: PHYSICAL THERAPIST

## 2017-04-26 PROCEDURE — 80048 BASIC METABOLIC PNL TOTAL CA: CPT | Performed by: INTERNAL MEDICINE

## 2017-04-26 PROCEDURE — 85027 COMPLETE CBC AUTOMATED: CPT | Performed by: INTERNAL MEDICINE

## 2017-04-26 PROCEDURE — 97116 GAIT TRAINING THERAPY: CPT | Performed by: PHYSICAL THERAPIST

## 2017-04-26 RX ORDER — SODIUM POLYSTYRENE SULFONATE 15 G/60ML
15 SUSPENSION ORAL; RECTAL
Status: COMPLETED | OUTPATIENT
Start: 2017-04-26 | End: 2017-04-26

## 2017-04-26 RX ORDER — SODIUM POLYSTYRENE SULFONATE 15 G/60ML
30 SUSPENSION ORAL; RECTAL
Status: COMPLETED | OUTPATIENT
Start: 2017-04-26 | End: 2017-04-26

## 2017-04-26 RX ADMIN — Medication 10 ML: at 22:11

## 2017-04-26 RX ADMIN — APIXABAN 2.5 MG: 2.5 TABLET, FILM COATED ORAL at 19:47

## 2017-04-26 RX ADMIN — ESCITALOPRAM OXALATE 15 MG: 10 TABLET ORAL at 10:28

## 2017-04-26 RX ADMIN — Medication 10 ML: at 19:47

## 2017-04-26 RX ADMIN — PRAVASTATIN SODIUM 40 MG: 40 TABLET ORAL at 19:47

## 2017-04-26 RX ADMIN — OXYBUTYNIN CHLORIDE 5 MG: 5 TABLET, FILM COATED, EXTENDED RELEASE ORAL at 10:29

## 2017-04-26 RX ADMIN — SODIUM POLYSTYRENE SULFONATE 15 G: 15 SUSPENSION ORAL; RECTAL at 22:11

## 2017-04-26 RX ADMIN — Medication 10 ML: at 14:49

## 2017-04-26 RX ADMIN — Medication 10 ML: at 06:45

## 2017-04-26 RX ADMIN — SODIUM POLYSTYRENE SULFONATE 30 G: 15 SUSPENSION ORAL; RECTAL at 11:53

## 2017-04-26 RX ADMIN — APIXABAN 2.5 MG: 2.5 TABLET, FILM COATED ORAL at 10:29

## 2017-04-26 NOTE — PROGRESS NOTES
Heart Failure Care Coordinator visited the patient in room. Provided introduction to self, and explanation of the Care Coordinator role. Patient was provided a copy of the [facility] letter. Patient made aware of contact information should any questions arise before or after discharge.

## 2017-04-26 NOTE — PROGRESS NOTES
Bedside and Verbal shift change report given to [facility] [place] (oncoming nurse) by Alan Naylor (offgoing nurse). Report included the following information SBAR, Kardex, MAR, Accordion and Recent Results.

## 2017-04-26 NOTE — PROGRESS NOTES
Cardiology Progress Note      4/26/2017 11:19 AM    Admit Date: 4/23/2017          Subjective: Continues to feel better. No new c/o. Echo done yest, results pending          Visit Vitals    BP 92/61    Pulse 60    Temp 98 °F (36.7 °C)    Resp 18    Ht 5' 1\" (1.549 m)    Wt 96.9 kg (213 lb 11.2 oz)    SpO2 95%    BMI 40.38 kg/m2     04/24 1901 - 04/26 0700  In: 440 [P.O.:440]  Out: -         Objective:      Physical Exam:  VS as above  Chest CTA  Card RRR 3/6 BETTY no gallop     Data Review:   Labs:    K 5.7  BUN 55  Creat 1.65  Hgb 13.0     Telemetry: SB R 51      Assessment:     1. Acute on chronic diastolic chf- better Prior EF 60% by echo   2. Hx of cath in past with mild CAD  3. Echo 4/2016  with mild to mod AS mean of 18  4. Bifasicular block  5. HTN  6. DM  7. CKD stage 3 - BUN increased   8. Hx of PE in 4/2016  9. Chronic back pain  10. ZEE on CpAP  11. Hyperkalemia        Plan:  Cont current Rx. Will review echo. Holding Lasix for increased creat. Kayexelate given.

## 2017-04-26 NOTE — PROGRESS NOTES
Problem: Mobility Impaired (Adult and Pediatric)  Goal: *Acute Goals and Plan of Care (Insert Text)  Physical Therapy Goals  Initiated 4/24/2017  1. Patient will move from supine to sit and sit to supine in bed with moderate assistance within 7 day(s). 2. Patient will transfer from bed to chair and chair to bed with minimal assistance/contact guard assist using the least restrictive device within 7 day(s). 3. Patient will perform sit to stand with moderate assistance within 7 day(s). 4. Patient will ambulate with minimal assistance/contact guard assist for 20 feet with the least restrictive device within 7 day(s). All while maintaining oxygen saturation >90%. PHYSICAL THERAPY TREATMENT  SEEN 4388 TO 1893        Patient: Vick Desouza (51 y.o. female)  Date: 4/26/2017  Diagnosis: Respiratory failure with hypoxia (HCC)  Acute on chronic diastolic (congestive) heart failure (HCC) <principal problem not specified>  Precautions: Fall; Incontinent      ASSESSMENT:  Patient seen for exercises, bed mobility, transfers and gait training. Noted to be totally at the end of the bed and she is unable to maneuver her self back to the head of the bed. Had not called out for assistance. Did a few LE exercises with assist prior to working on bed mobility. Needed assist x 2 to roll and bring LEs over edge of bed to sit at bedside. Good sitting balance. Stood with mod assist x 2 to the RW. She immediately urinated in the floor. She stated that she was incontinent at home and had to wear briefs. Ambulated to the chair on the other side of the room (20') with RW and min assist.  Slow steady pace and no LOB. Got the floor cleaned up and her cleaned up and new linens and socks. Asked if she needed to get on the bedside commode and she said no that she was done. Stood up to get her back to bed and she urinated in the floor again. Cleaned up again and then got her back to bed.  Recommend putting on a brief before trying to mobilize her. Got her settled in bed. Nurse made aware. Progression toward goals:  [ ]    Improving appropriately and progressing toward goals  [X]    Improving slowly and progressing toward goals  [ ]    Not making progress toward goals and plan of care will be adjusted       PLAN:  Patient continues to benefit from skilled intervention to address the above impairments. Continue treatment per established plan of care. Discharge Recommendations:  Bashir Samuel  Further Equipment Recommendations for Discharge:  Defer to SNF rehab       SUBJECTIVE:   Patient stated I'm incontinent at home, I have to wear a brief.       OBJECTIVE DATA SUMMARY:   Critical Behavior:  Neurologic State: Alert, Appropriate for age  Orientation Level: Oriented X4  Cognition: Appropriate decision making, Appropriate for age attention/concentration  Safety/Judgement: Awareness of environment, Fall prevention  Functional Mobility Training:  Bed Mobility:  Rolling: Moderate assistance;Assist x2  Supine to Sit: Moderate assistance  Sit to Supine: Moderate assistance  Scooting: Minimum assistance     Transfers:  Sit to Stand: Moderate assistance;Assist x2  Stand to Sit: Minimum assistance;Assist x2  Bed to Chair: Moderate assistance;Assist x2        Balance:  Sitting: Intact  Standing: Impaired  Standing - Static: Constant support;Good  Standing - Dynamic : Fair     Ambulation/Gait Training:  Distance (ft): 20 Feet (ft)  Assistive Device: Gait belt;Walker, rolling  Ambulation - Level of Assistance: Minimal assistance  Gait Abnormalities: Decreased step clearance  Base of Support: Widened  Speed/Irene: Pace decreased (<100 feet/min)  Step Length: Left shortened;Right shortened     Therapeutic Exercises: Toe wiggles, ankle pumps, hip internal/external rotation     Pain:  Denied any pain     Activity Tolerance: Tolerated PT treatment session well.   Please refer to the flowsheet for vital signs taken during this treatment.   After treatment:   [ ]    Patient left in no apparent distress sitting up in chair  [X]    Patient left in no apparent distress in bed  [X]    Call bell left within reach  [X]    Nursing notified  [ ]    Caregiver present  [ ]    Bed alarm activated (not being used)      COMMUNICATION/COLLABORATION:   The patients plan of care was discussed with: Registered Nurse     Miguel A Guerra, PT  Time Calculation: 30 mins

## 2017-04-26 NOTE — ROUTINE PROCESS
Bedside and Verbal shift change report given to Erik Patton (oncoming nurse) by Kesha Layton  (offgoing nurse). Report included the following information SBAR, Kardex, Intake/Output, MAR, Recent Results and Med Rec Status.

## 2017-04-26 NOTE — CARDIO/PULMONARY
C/P Rehab Note:  CHF BUNDLE PATIENT   Chart Reviewed. Admitting diagnosis of Acute Respiratory Failure and Acute Diastolic HF. H&P note reflects she has not been taking her Lasix as prescribed.      PMH significant for:  -HTN  -DM  -CKD stage 3  -ZEE on CPAP  -Obesity  ,  with dementia, uses a walker. Pt is a non smoker. Pt received teaching from our department last on 5/16. Met with patient, her  and two sons. CHF teaching folder at bed side. Reviewed S&Ss, daily weights, when to call MD for fluid weight gain, low sodium diet, medication compliance, and MD follow up. Pt was able to name her fluid pill. Chales Minus She was also able to state weight parameters. She knows she should follow a low sodium diet. Reviewed some specific high sodium foods to avoid, reading food labels for sodium content and use of salt substitute or other herbs/spices for flavor enhancement. Pt verbalized understanding but would benefit from further reinforcement.

## 2017-04-26 NOTE — PROGRESS NOTES
Bedside interdisciplinary rounds were held today to discuss patient plan of care and outcomes. The following members were present: Physician, Nurse, Clinical Care Leader, Pharmacy, Physical Therapy, and Case Management. Plan:  Awaiting Echo results. Will return to [facility].

## 2017-04-27 VITALS
WEIGHT: 213.7 LBS | OXYGEN SATURATION: 96 % | SYSTOLIC BLOOD PRESSURE: 126 MMHG | BODY MASS INDEX: 40.35 KG/M2 | RESPIRATION RATE: 18 BRPM | HEART RATE: 77 BPM | DIASTOLIC BLOOD PRESSURE: 77 MMHG | TEMPERATURE: 98 F | HEIGHT: 61 IN

## 2017-04-27 LAB
ANION GAP BLD CALC-SCNC: 10 MMOL/L (ref 5–15)
BUN SERPL-MCNC: 49 MG/DL (ref 6–20)
BUN/CREAT SERPL: 38 (ref 12–20)
CALCIUM SERPL-MCNC: 8.4 MG/DL (ref 8.5–10.1)
CHLORIDE SERPL-SCNC: 104 MMOL/L (ref 97–108)
CO2 SERPL-SCNC: 26 MMOL/L (ref 21–32)
CREAT SERPL-MCNC: 1.28 MG/DL (ref 0.55–1.02)
ERYTHROCYTE [DISTWIDTH] IN BLOOD BY AUTOMATED COUNT: 15.1 % (ref 11.5–14.5)
GLUCOSE BLD STRIP.AUTO-MCNC: 119 MG/DL (ref 65–100)
GLUCOSE BLD STRIP.AUTO-MCNC: 94 MG/DL (ref 65–100)
GLUCOSE SERPL-MCNC: 94 MG/DL (ref 65–100)
HCT VFR BLD AUTO: 42.7 % (ref 35–47)
HGB BLD-MCNC: 13.4 G/DL (ref 11.5–16)
MCH RBC QN AUTO: 30.1 PG (ref 26–34)
MCHC RBC AUTO-ENTMCNC: 31.4 G/DL (ref 30–36.5)
MCV RBC AUTO: 96 FL (ref 80–99)
PLATELET # BLD AUTO: 147 K/UL (ref 150–400)
POTASSIUM SERPL-SCNC: 5 MMOL/L (ref 3.5–5.1)
RBC # BLD AUTO: 4.45 M/UL (ref 3.8–5.2)
SERVICE CMNT-IMP: ABNORMAL
SERVICE CMNT-IMP: NORMAL
SODIUM SERPL-SCNC: 140 MMOL/L (ref 136–145)
WBC # BLD AUTO: 4.8 K/UL (ref 3.6–11)

## 2017-04-27 PROCEDURE — 85027 COMPLETE CBC AUTOMATED: CPT | Performed by: INTERNAL MEDICINE

## 2017-04-27 PROCEDURE — 80048 BASIC METABOLIC PNL TOTAL CA: CPT | Performed by: INTERNAL MEDICINE

## 2017-04-27 PROCEDURE — 74011250637 HC RX REV CODE- 250/637: Performed by: INTERNAL MEDICINE

## 2017-04-27 PROCEDURE — 82962 GLUCOSE BLOOD TEST: CPT

## 2017-04-27 PROCEDURE — 36415 COLL VENOUS BLD VENIPUNCTURE: CPT | Performed by: INTERNAL MEDICINE

## 2017-04-27 RX ORDER — METOPROLOL SUCCINATE 25 MG/1
25 TABLET, EXTENDED RELEASE ORAL DAILY
Qty: 30 TAB | Refills: 0 | Status: SHIPPED | OUTPATIENT
Start: 2017-04-27

## 2017-04-27 RX ORDER — OXYBUTYNIN CHLORIDE 5 MG/1
5 TABLET, EXTENDED RELEASE ORAL DAILY
Qty: 30 TAB | Refills: 0 | Status: SHIPPED | OUTPATIENT
Start: 2017-04-27

## 2017-04-27 RX ADMIN — OXYBUTYNIN CHLORIDE 5 MG: 5 TABLET, FILM COATED, EXTENDED RELEASE ORAL at 08:37

## 2017-04-27 RX ADMIN — LISINOPRIL 40 MG: 20 TABLET ORAL at 08:37

## 2017-04-27 RX ADMIN — ESCITALOPRAM OXALATE 15 MG: 10 TABLET ORAL at 08:35

## 2017-04-27 RX ADMIN — METOPROLOL SUCCINATE 25 MG: 25 TABLET, EXTENDED RELEASE ORAL at 08:37

## 2017-04-27 RX ADMIN — Medication 10 ML: at 05:47

## 2017-04-27 RX ADMIN — APIXABAN 2.5 MG: 2.5 TABLET, FILM COATED ORAL at 08:37

## 2017-04-27 NOTE — DISCHARGE SUMMARY
Hospitalist Discharge Summary     Patient ID:  Vick Desouza  [id number]  61 y.o.  7/8/1933    PCP on record: Nicolle Mcallister MD    Admit date: 4/23/2017  Discharge date and time: 4/27/2017      DISCHARGE DIAGNOSIS:  S/p acute resp failure  Acute on chronic diastolic heart failure  Moderate aortic stenosis  PE hx    DM 2  HLD  S/p hyperkalemia  Stage 3 poa sacral ulcer  Incidental CT head finding \"Upward convexity of the pituitary gland which is unusual in this age group but nonspecific. \"  -no obvious endocrine symptoms  - Thyroid studies and prolactin level ordered on admission - WNL    CONSULTATIONS:  IP CONSULT TO CARDIOLOGY    Excerpted HPI from H&P of Ryan Robins MD:   Jairo Mosqueda is a 80 y.o.  female who presents with progressively worsening VALDOVINOS and LE Edema x 3 days. She says she takes 60mg lasix daily but is unclear on what she is supposed to be taking at home. Has not taken her morning medications. Says she has issues with urinating at night and is not always compliant with her lasix. Endorses following a salt and fluid restricted diet. Endorses orthopnea, denies PND. She denies CP, cough, fever, chills, N/V, Diarrhea/constipation.      In ED patient has elevated proBNP 19919 and trop wnl. She is hypoxic to the 80's on RA with otherwise stable vitals. Doing well now on 2L NC. Has a history of DVT/PE on eliquis. Not currently tachycardic, no LE pain. Wells score for PE of 1.5, low probability and no further imaging ordered.        ______________________________________________________________________  DISCHARGE SUMMARY/HOSPITAL COURSE:  for full details see H&P, daily progress notes, labs, consult notes.      Acute hypoxic respiratory failure secondary to   Acute on chronic diastolic heart failure, Severe Pulmonary hypertension, Moderate AS  History of PE in 4/2016 on eliquis  bradycardia  Iv diuresis  Echo 4/2016 EF 60% with moderate AS, severe pulmonary hypertension  Non complaint with lasix as outpaitent  -CXR- No acute cardiopulmonary disease radiographically. Minimal bibasilar atelectasis or scar.  -supplemental O2 to maintain sat >90%. Wean as tolerated     -A1C (5.6), LDL 34, TSH wnl  -continue statin, ACEi. On eliquis. Not on ASA. Decreased BB   -Started 60mg IV lasix bid. Decreased yest to 40 bid as creat rising. then will stopped. Resumed on DC lower dose  - repeat echo: Echo showed nl EF, probably moderate AS   -Follows with cardiology, Dr. Chio Elam, consulted while inpatient.      DM II  -last A1C 5.6,  PTA lantus 35 units     Kept off the lantus for now, poc and sliding scale         Hx of PE/DVT  -prior PE may of 2016  -continue on pta eliquis . defer to PCP regarding the eliquis ( as not sure of the plan regarding anticoagulation for life time ? ??) pt has not filled this since 2016 and recommended dose is 2.5mg for 6 months BID      HTN  -pta lisinopril and toprol  -prn IV hydralazine  Hyperkalemia  -brannon excelate     HLD-cont pta statin  CKD 3-stable, monitor     ZEE  Depression-pta lexapro      Obesity, BMI 39   Incidental CT head finding \"Upward convexity of the pituitary gland which is unusual in this age group but nonspecific. \"  -no obvious endocrine symptoms  - Thyroid studies and prolactin level ordered on admission - WNL     Stage 3 poa sacral ulcer        _______________________________________________________________________  Patient seen and examined by me on discharge day. Pertinent Findings:  Gen:    Not in distress    CV:  Regular rhythm,  + edema  Abd:  Soft, not distended, not tender    _______________________________________________________________________  DISCHARGE MEDICATIONS:   Current Discharge Medication List      CONTINUE these medications which have CHANGED    Details   apixaban (ELIQUIS) 2.5 mg tablet Take 1 Tab by mouth two (2) times a day.   Qty: 60 Tab, Refills: 0      metoprolol succinate (TOPROL-XL) 25 mg XL tablet Take 1 Tab by mouth daily. Qty: 30 Tab, Refills: 0      oxybutynin chloride XL (DITROPAN XL) 5 mg CR tablet Take 1 Tab by mouth daily. Indications: URINARY URGE INCONTINENCE  Qty: 30 Tab, Refills: 0         CONTINUE these medications which have NOT CHANGED    Details   escitalopram oxalate (LEXAPRO) 10 mg tablet Take 15 mg by mouth daily. lisinopril (PRINIVIL, ZESTRIL) 40 mg tablet Take 40 mg by mouth daily. lovastatin (MEVACOR) 40 mg tablet Take 40 mg by mouth nightly. STOP taking these medications                insulin detemir (LEVEMIR FLEXPEN) 100 unit/mL (3 mL) inpn Comments:   Reason for Stopping:         metoprolol tartrate (LOPRESSOR) 50 mg tablet Comments:   Reason for Stopping:         furosemide (LASIX) 80 mg tablet Comments:   Reason for Stopping:         insulin detemir (LEVEMIR FLEXPEN) 100 unit/mL (3 mL) inpn Comments:   Reason for Stopping:               My Recommended Diet, Activity, Wound Care, and follow-up labs are listed in the patient's Discharge Insturctions which I have personally completed and reviewed.     _______________________________________________________________________  DISPOSITION:    Home with Family:    Home with HH/PT/OT/RN:    SNF/LTC: x   JULIEN:    OTHER:        Condition at Discharge:  Stable  _______________________________________________________________________  Follow up with:   PCP : Nayla Slade MD        Total time in minutes spent coordinating this discharge (includes going over instructions, follow-up, prescriptions, and preparing report for sign off to her PCP) :  35 minutes    Signed:  Inna Beck MD

## 2017-04-27 NOTE — ROUTINE PROCESS
[facility] to Dev Araya                                                                        80 y.o.   female    [address] [facility]   Room: [facility] 3 ORTHOPEDICS  Unit Phone# :  [phone]      [address]  [facility] [address]  [address] [address]  Dept: [phone]  Loc: [phone]                    SITUATION     Admitted:  4/23/2017         Attending Provider:  Karthik Santizo MD       Consultations:  IP CONSULT TO CARDIOLOGY    PCP:  Amado Bourne MD   [phone]    Treatment Team: Attending Provider: Karthik Santizo MD; Consulting Provider: Hina Martínez MD; Consulting Provider: Annette Olmstead MD; Utilization Review: Marielle Navarro RN    Admitting Dx:  Respiratory failure with hypoxia [facility])  Acute on chronic diastolic [facility]congest[facility]       Principal Problem: <principal problem not specified>    * No surgery found * of      BY: * Surgery not found *             ON: * No surgery found *                  Code Status: Full Code                Advance Directives:   Advance Care Planning 4/23/2017   Patient's Griselda 8 is: Legal Next of Johnny 69   Primary Decision Maker Name -   Primary Decision Maker Relationship to Patient Adult child   Confirm Advance Directive None   Patient Would Like to Complete Advance Directive -   Does the patient have other document types -    (Send w/patient)   Not Received       Isolation:  There are currently no Active Isolations       MDRO: No current active infections    Pain Medications given:  N/A    Last dose: 4/27/2017 at  [address] needed: yes  Type of equipment:    peritoneal dialysis  (Not currently on dialysis)  (Not currently on dialysis)  (Not currently on dialysis)     BACKGROUND     Allergies:  No Known Allergies    Past Medical History:   Diagnosis Date    CAD (coronary artery disease)     MI in 07    Diabetes ([facility] [facility]  Hypertension     Other ill-defined conditions     Sleep apnea    Sleep disorder        Past Surgical History:   Procedure Laterality Date    HX APPENDECTOMY      HX CHOLECYSTECTOMY      HX HEENT      Tonsilectomy    HX ORTHOPAEDIC      Left knee replacement    HX ORTHOPAEDIC      Elbow       Prescriptions Prior to Admission   Medication Sig    escitalopram oxalate (LEXAPRO) 10 mg tablet Take 15 mg by mouth daily.  furosemide (LASIX) 20 mg tablet Take 20 mg by mouth daily.  insulin detemir (LEVEMIR FLEXPEN) 100 unit/mL (3 mL) inpn 50 Units by SubCUTAneous route nightly.  metoprolol tartrate (LOPRESSOR) 50 mg tablet Take 50 mg by mouth two (2) times a day.  trimethoprim-sulfamethoxazole (BACTRIM DS) 160-800 mg per tablet Take 1 Tab by mouth two (2) times a day. For 10 days    APIXABAN PO Take 5 mg by mouth two (2) times a day.  lisinopril (PRINIVIL, ZESTRIL) 40 mg tablet Take 40 mg by mouth daily.  lovastatin (MEVACOR) 40 mg tablet Take 40 mg by mouth nightly. Hard scripts included in transfer packet yes    Vaccinations:    Immunization History   Administered Date(s) Administered    Pneumococcal Vaccine (Unspecified Type) 01/01/2006       Readmission Risks:    Known Risks: none        The Charlson CoMorbitiy Index tool is an evidenced based tool that has more automatic generated information. The tool looks at many different items such as the age of the patient, how many times they were admitted in the last calendar year, current length of stay in the hospital and their diagnosis. All of these items are pulled automatically from information documented in the chart from various places and will generate a score that predicts whether a patient is at low (less than 13), medium (13-20) or high (21 or greater) risk of being readmitted.         ASSESSMENT                Temp: 98 °F (36.7 °C) (04/27/17 1111) Pulse (Heart Rate): 77 (04/27/17 1111)     Resp Rate: 18 (04/27/17 1111) BP: 126/77 (04/27/17 1111)     O2 Sat (%): 96 % (04/27/17 1111)     Weight: 96.9 kg (213 lb 11.2 oz)    Height: 5' 1\" (154.9 cm) (04/24/17 0754)       If above not within 1 hour of discharge:    BP:_____  P:____  R:____ T:_____ O2 Sat: ___%  O2: ______    Active Orders   Diet    DIET DIABETIC CONSISTENT CARB Regular; 2 GM NA (House Low NA); FR 1200ML; AHA-LOW-CHOL FAT         Orientation: oriented to time, place, person and situation     Active Behaviors: None                                   Active Lines/Drains:  (Peg Tube / Garcia / CL or S/L?): no    Urinary Status: Incontinent, Incontinent briefs     Last BM: Last Bowel Movement Date: 04/27/17     Skin Integrity: Wound (add Wound LDA) (sacral)   Wound Sacral/coccyx Posterior-DRESSING STATUS: Clean, dry, and intact    Wound Sacral/coccyx Posterior-DRESSING TYPE: Foam    Mobility: Slightly limited   Weight Bearing Status: WBAT (Weight Bearing as Tolerated)      Gait Training  Assistive Device: Gait belt, Walker, rolling  Ambulation - Level of Assistance: Minimal assistance  Distance (ft): 20 Feet (ft)         Lab Results   Component Value Date/Time    Glucose 94 04/27/2017 04:37 AM    Hemoglobin A1c 5.6 04/24/2017 04:19 AM    INR 1.3 04/23/2017 12:07 PM    INR 1.1 05/25/2016 11:37 AM    HGB 13.4 04/27/2017 04:37 AM    HGB 13.0 04/26/2017 03:26 AM        RECOMMENDATION     See After Visit Summary (AVS) for:  · Discharge instructions  · After [address]   · Special equipment needed (entered pre-discharge by Care Management)  · Medication Reconciliation    · Follow up Appointment(s)         Report given/sent by:  Oscar Isabel RN                    Verbal report given to: Svetlana  FAXED to:  [address] [facility]         Estimated discharge time:  4/27/2017 at 1500

## 2017-04-27 NOTE — PROGRESS NOTES
Met with patient to discuss her transportation arrangement to [address]. Pt said she will talk to her son and see if he can transport her. Pt spoke to son and he is only available to transport pt after work. CM spoke to pt son. He said he is at work and wont be able to transport pt until his work over. pt son requested CM to arrange transportation from hospital.CM informed pt son that CM will send referral to [facility] for BLS transportation and see pt insurance will cover and If pt insurance not fully cover transportation pt is responsible to pay out of pocket. Pt son agreed. Met with patient to discuss her transportation arrangement to [address]. Pt said she will talk to her son and see if he can transport her. Pt spoke to son and informed that family is only available to transport her after work. CM spoke to pt son he said he is at work and wont be able to transport her until his work over. pt son requested CM to arrange transportation from hospital.CM informed pt son that CM will send referral to [facility] for transportation and see pt insurance will cover and If pt insurance not fully cover transportation pt is responsible to pay out of pocket. Pt son agreed. [facility] 2nd letter given signed copy attached to pt bedside chart. CM notified Bertram Ann at [address] that pt is discharging today at 3.00 PM.    Transport has been arranged with a  time of 3.00 PM by [facility]. Floor nurse can call report to [address] at [phone] room 106 B, please send most recent MAR, copy of d/c instructions, SNF hand off report, and any prescriptions that need to go with the pt. Care Management Interventions  PCP Verified by CM:  Yes (not seen by PCP for last 6 months.)  Mode of Transport at Discharge: BLS  Transition of Care Consult (CM Consult): SNF  Partner SNF: Yes  Discharge Durable Medical Equipment:  (pt has RW and showe chair.)  Health Maintenance Reviewed: Yes  Physical Therapy Consult: Yes  Occupational Therapy Consult: Yes  Current Support Network: Lives with Spouse (single story home has 3 steps at entrance and ramp attached.  pt  has private pay personal care)  Confirm Follow Up Transport: Family  Plan discussed with Pt/Family/Caregiver: Yes  Discharge Location  Discharge Placement: Skilled nursing facility      [facility]  Ext [phone]

## 2017-04-27 NOTE — ROUTINE PROCESS
Bedside and Verbal shift change report given to Richelle Rodriguez RN (oncoming nurse) by Beba Younger RN (offgoing nurse). Report included the following information SBAR, Kardex, ED Summary, Intake/Output, MAR, Accordion, Recent Results, Med Rec Status and Cardiac Rhythm sinus guy.

## 2017-04-27 NOTE — PROGRESS NOTES
Cardiology Progress Note      4/27/2017 1:32 PM    Admit Date: 4/23/2017          Subjective: D/c plans noted. Constipated. SOB better. Echo showed nl EF, probably moderate AS          Visit Vitals    /77    Pulse 77    Temp 98 °F (36.7 °C)    Resp 18    Ht 5' 1\" (1.549 m)    Wt 96.9 kg (213 lb 11.2 oz)    SpO2 96%    BMI 40.38 kg/m2     04/25 1901 - 04/27 0700  In: 440 [P.O.:440]  Out: -         Objective:      Physical Exam:  VS as above     Data Review:     Labs:    K 5.0  BUN 49  creat 1.3   Hgb 13.4     Telemetry: SR R 70       Assessment:     1. Acute on chronic diastolic chf- better  EF 40% by echo   2. Hx of cath in past with mild CAD  3. Echo 4/2016 with  mod AS mean grad of 27  4. Bifasicular block  5. HTN  6. DM  7. CKD stage 3 - BUN increased   8. Hx of PE in 4/2016  9. Chronic back pain  10. ZEE on CpAP  11. Hyperkalemia      Plan: Agree with d/c plans. F/u with me 1-2 mos. Needs to go back on loop diuretic.  I suggest Lasix 40 mg every day

## 2017-05-04 ENCOUNTER — PATIENT OUTREACH (OUTPATIENT)
Dept: CASE MANAGEMENT | Age: 82
End: 2017-05-04

## 2017-05-04 NOTE — PROGRESS NOTES
Community Care Team Documentation for Patient in [facility]  Initial Follow up     Patient was admitted to [facility] from 4/23 to 4/27. Patient was discharged to [facility], on 4/27/17 (date). Hospital Discharge diagnosis: Acute on chronic diastolic chf . PCP : Bala Nicolas MD    Information provided by SNF staff April Conte, is as follows:    projected LOS 4 weeks; on PT/OT caseload     MD note of 5/3 a/p  A/P:  1. s/p Acute on chronic respiratory failure - clinically stable, on cont O2 secondary to #2 - cont to monitor closely  2. Acute on chronic diastolic HF - clinically stable, pt is on BB, ace, and diuretics - cont to monitor closely  3. Severe pulmonary HTN - chronic, stable, cont current meds, will discuss treatment options such as vasodialator and endothelin receptor antagonist treatment for the pulmonary HTN, will coordinate with pt's pulmonologist  4. Hx of PE - cont on Eloquis  5.  Debility - Ongoing, agrees to participate with rehab therapy    JERMAINE Wen

## 2017-05-11 ENCOUNTER — PATIENT OUTREACH (OUTPATIENT)
Dept: INTERNAL MEDICINE CLINIC | Age: 82
End: 2017-05-11

## 2017-05-11 ENCOUNTER — PATIENT OUTREACH (OUTPATIENT)
Dept: CASE MANAGEMENT | Age: 82
End: 2017-05-11

## 2017-05-11 NOTE — PROGRESS NOTES
Community Care Team Documentation for Patient in [facility]  Subsequent Follow up     Patient remains at [facility] ([facility]). See previous [facility] Team notes. PCP : Clementine Avila MD    Information provided today by SNF staff member, Gris Duran, is as follows: Anticipated discharge date from SNF:  Undetermined  SNF discharge plan:  Patient continues to progress slowly. PT/OT mins dropped due to low participation. Pt requiring max encouragement from therapy. LOS anticipated to be 4 more weeks. During your path meeting, pts son expressed concern of non compliance with home therapy upon SNF d/c. SNF to encourage [facility]. Plan to d/c home with spouse and sons assistance.    PCP follow up appointment:  SNF to schedule    Heri Melgar, MSN, RN, ACNS-BC, [facility]  Nurse Navigator, [employer] [phone]

## 2017-05-11 NOTE — PROGRESS NOTES
Heart Failure Care Coordinator viewed the patients SNF updates via fax from [facility] [facility] to perform post weekly assessment of CHF bundle patient. CHF coordinator viewed notes and vitals pertaining to patient. CHF coordinator will contact patient directly once SNF course complete. CHF coordinator will contact Anette Carpenter with [facility] if needs arise. Ms. Jw Ortez also aware she can contact CHF coordinator at any time as well. Per SNF notes pt's CHF clinically stable, pt participating in rehab therapy. projected LOS 4 weeks.

## 2017-05-18 ENCOUNTER — PATIENT OUTREACH (OUTPATIENT)
Dept: CASE MANAGEMENT | Age: 82
End: 2017-05-18

## 2017-05-24 ENCOUNTER — PATIENT OUTREACH (OUTPATIENT)
Dept: CASE MANAGEMENT | Age: 82
End: 2017-05-24

## 2017-05-25 ENCOUNTER — PATIENT OUTREACH (OUTPATIENT)
Dept: CASE MANAGEMENT | Age: 82
End: 2017-05-25

## 2017-05-25 NOTE — PROGRESS NOTES
Community Care Team Documentation for Patient in [facility]  Subsequent Follow up     Patient remains at [facility] ([facility]). See previous [facility] Team notes. RRAT score: Low Risk            11       Total Score        2 Patient Living Status    4 More than 1 Admission in calendar year    5 Charlson Comorbidity Score        Criteria that do not apply:    Relationship with PCP    Patient Length of Stay > 5    Patient Insurance is Medicare, Medicaid or Self Pay         PCP : Beverley Yan MD    Information provided today by SNF staff member,, Robin Martinez, is as follows:    Pt continues to progress with therapy. Lasix change to 40mg continues. Weight upon hospital d/c: 213lb. Weight currently: 194lb. SNF reports an increase in Lasix. No signs or symptoms reported. [facility] and sons [facility].        ALPA SolimanW

## 2017-06-01 ENCOUNTER — PATIENT OUTREACH (OUTPATIENT)
Dept: INTERNAL MEDICINE CLINIC | Age: 82
End: 2017-06-01

## 2017-06-01 NOTE — PROGRESS NOTES
Community Care Team Documentation for Patient in [facility]  Subsequent Follow up     Patient remains at [facility] ([facility]). See previous [facility] Team notes. RRAT score: Low Risk            11       Total Score        2 Patient Living Status    4 More than 1 Admission in calendar year    5 Charlson Comorbidity Score        Criteria that do not apply:    Relationship with PCP    Patient Length of Stay > 5    Patient Insurance is Medicare, Medicaid or Self Pay         PCP : Michelle Salcedo MD    Information provided today by SNF staff member, Poornima Dunbar, is as follows:    HF bundle 90 day end date 7/26/2017. Weights and vitals stable. No current fluid restrictions. Walking 75 ft without shortness of breath. O2 ranging from 95% to 96%. Neb treatments changed from PRN to scheduled due to bronchitis. D/c anticipated in 1 to 2 weeks pending progress with bronchitis. Plan to d/c home with [facility], spouse and sons assistance. Anticipated discharge date from SNF:  Undetermined  SNF discharge plan:  Home with home health, spouse and son's assistance.     PCP follow up appointment:  SNF to schedule    Kirk Melgar, MSN, RN, ACNS-BC, [facility]  Nurse Navigator, [employer] [phone]

## 2017-06-08 ENCOUNTER — PATIENT OUTREACH (OUTPATIENT)
Dept: CASE MANAGEMENT | Age: 82
End: 2017-06-08

## 2017-06-08 NOTE — PROGRESS NOTES
Community Care Team Documentation for Patient in [facility]  Subsequent Follow up     Patient remains at [facility] ([facility]). See previous [facility] Team notes. RRAT score: Low Risk            11       Total Score        2 Patient Living Status    4 More than 1 Admission in calendar year    5 Charlson Comorbidity Score        Criteria that do not apply:    Relationship with PCP    Patient Length of Stay > 5    Patient Insurance is Medicare, Medicaid or Self Pay         PCP : Odette Anderson MD    Information provided today by SNF staff member, [facility], is as follows:    HF bundle. 90 day end date 7/26/2017. Weights and vitals stable. Following cardiac diet. Walking 100 ft without shortness of breath. Completed antibiotic. Bronchitis has improved. Neb treatments are as needed now. Discharge is anticipated in 1-2 weeks to ensure no rebound from bronchitis. Plan to d/c home with [facility], spouse and sons assistance.     JERMAINE Sanchez

## 2017-06-13 ENCOUNTER — PATIENT OUTREACH (OUTPATIENT)
Dept: CASE MANAGEMENT | Age: 82
End: 2017-06-13

## 2017-06-14 ENCOUNTER — HOME HEALTH ADMISSION (OUTPATIENT)
Dept: HOME HEALTH SERVICES | Facility: HOME HEALTH | Age: 82
End: 2017-06-14
Payer: MEDICARE

## 2017-06-15 ENCOUNTER — PATIENT OUTREACH (OUTPATIENT)
Dept: CASE MANAGEMENT | Age: 82
End: 2017-06-15

## 2017-06-15 NOTE — PROGRESS NOTES
Community Care Team Documentation for Patient in [facility]  Subsequent Follow up     Patient remains at [facility] ([facility]). See previous [facility] Team notes. RRAT score: Low Risk            11       Total Score        2 Patient Living Status    4 More than 1 Admission in calendar year    5 Charlson Comorbidity Score        Criteria that do not apply:    Relationship with PCP    Patient Length of Stay > 5    Patient Insurance is Medicare, Medicaid or Self Pay         PCP : Kyra Street MD   Information provided today by SNF staff member, Melissa Fallon, is as follows:    HF bundle. 90 day end date 7/26/2017. Weights and vitals stable. Admit weight: 205lb. Current weight: 183.6lb. Pt continues to progress with therapy. Therapy recommending pt to continue but pt requested to return home today with . Pt to d/c home today 6/15 with [facility]. HF team aware.    JERMAINE Vallecillo

## 2017-06-17 ENCOUNTER — HOME CARE VISIT (OUTPATIENT)
Dept: SCHEDULING | Facility: HOME HEALTH | Age: 82
End: 2017-06-17
Payer: MEDICARE

## 2017-06-17 PROCEDURE — 3331090001 HH PPS REVENUE CREDIT

## 2017-06-17 PROCEDURE — 400013 HH SOC

## 2017-06-17 PROCEDURE — G0299 HHS/HOSPICE OF RN EA 15 MIN: HCPCS

## 2017-06-17 PROCEDURE — 3331090002 HH PPS REVENUE DEBIT

## 2017-06-18 ENCOUNTER — HOME CARE VISIT (OUTPATIENT)
Dept: SCHEDULING | Facility: HOME HEALTH | Age: 82
End: 2017-06-18
Payer: MEDICARE

## 2017-06-18 VITALS
HEART RATE: 91 BPM | OXYGEN SATURATION: 95 % | SYSTOLIC BLOOD PRESSURE: 104 MMHG | TEMPERATURE: 98 F | RESPIRATION RATE: 18 BRPM | DIASTOLIC BLOOD PRESSURE: 70 MMHG

## 2017-06-18 PROCEDURE — G0300 HHS/HOSPICE OF LPN EA 15 MIN: HCPCS

## 2017-06-18 PROCEDURE — 3331090002 HH PPS REVENUE DEBIT

## 2017-06-18 PROCEDURE — 3331090001 HH PPS REVENUE CREDIT

## 2017-06-19 ENCOUNTER — HOME CARE VISIT (OUTPATIENT)
Dept: SCHEDULING | Facility: HOME HEALTH | Age: 82
End: 2017-06-19
Payer: MEDICARE

## 2017-06-19 VITALS
SYSTOLIC BLOOD PRESSURE: 130 MMHG | TEMPERATURE: 98.7 F | OXYGEN SATURATION: 98 % | HEART RATE: 74 BPM | DIASTOLIC BLOOD PRESSURE: 84 MMHG | RESPIRATION RATE: 22 BRPM

## 2017-06-19 VITALS
TEMPERATURE: 97.3 F | OXYGEN SATURATION: 90 % | HEART RATE: 68 BPM | SYSTOLIC BLOOD PRESSURE: 90 MMHG | DIASTOLIC BLOOD PRESSURE: 55 MMHG

## 2017-06-19 VITALS
OXYGEN SATURATION: 97 % | DIASTOLIC BLOOD PRESSURE: 60 MMHG | RESPIRATION RATE: 18 BRPM | SYSTOLIC BLOOD PRESSURE: 110 MMHG | BODY MASS INDEX: 35.07 KG/M2 | WEIGHT: 185.6 LBS | HEART RATE: 55 BPM | TEMPERATURE: 97.9 F

## 2017-06-19 VITALS
SYSTOLIC BLOOD PRESSURE: 132 MMHG | HEART RATE: 62 BPM | OXYGEN SATURATION: 94 % | RESPIRATION RATE: 18 BRPM | DIASTOLIC BLOOD PRESSURE: 68 MMHG | TEMPERATURE: 97.9 F

## 2017-06-19 PROCEDURE — G0151 HHCP-SERV OF PT,EA 15 MIN: HCPCS

## 2017-06-19 PROCEDURE — A4649 SURGICAL SUPPLIES: HCPCS

## 2017-06-19 PROCEDURE — A4216 STERILE WATER/SALINE, 10 ML: HCPCS

## 2017-06-19 PROCEDURE — 3331090001 HH PPS REVENUE CREDIT

## 2017-06-19 PROCEDURE — G0300 HHS/HOSPICE OF LPN EA 15 MIN: HCPCS

## 2017-06-19 PROCEDURE — A6216 NON-STERILE GAUZE<=16 SQ IN: HCPCS

## 2017-06-19 PROCEDURE — A6199 ALGINATE DRSG WOUND FILLER: HCPCS

## 2017-06-19 PROCEDURE — A6212 FOAM DRG <=16 SQ IN W/BORDER: HCPCS

## 2017-06-19 PROCEDURE — A9270 NON-COVERED ITEM OR SERVICE: HCPCS

## 2017-06-19 PROCEDURE — G0152 HHCP-SERV OF OT,EA 15 MIN: HCPCS

## 2017-06-19 PROCEDURE — 3331090002 HH PPS REVENUE DEBIT

## 2017-06-20 ENCOUNTER — PATIENT OUTREACH (OUTPATIENT)
Dept: CASE MANAGEMENT | Age: 82
End: 2017-06-20

## 2017-06-20 PROCEDURE — 3331090001 HH PPS REVENUE CREDIT

## 2017-06-20 PROCEDURE — 3331090002 HH PPS REVENUE DEBIT

## 2017-06-20 NOTE — PROGRESS NOTES
Heart Failure Care Coordinator viewed the patients [facility] Patient Portal- to perform post hospitalization discharge assessment.  Verified  and address as identifiers. Viewed  notes, vitals pertaining to patient. Will contact patient directly once home health course complete. Noted Concerns: Weights not listed on earlier [facility] documentation( admission), wt was documented 17-  lbs. Pt does have 4+ pitting edema to ankles, earlier notes suggested edema was 3+ pitting. Will continue to follow.

## 2017-06-21 ENCOUNTER — HOME CARE VISIT (OUTPATIENT)
Dept: HOME HEALTH SERVICES | Facility: HOME HEALTH | Age: 82
End: 2017-06-21
Payer: MEDICARE

## 2017-06-21 ENCOUNTER — HOME CARE VISIT (OUTPATIENT)
Dept: SCHEDULING | Facility: HOME HEALTH | Age: 82
End: 2017-06-21
Payer: MEDICARE

## 2017-06-21 VITALS
RESPIRATION RATE: 20 BRPM | BODY MASS INDEX: 35.14 KG/M2 | OXYGEN SATURATION: 92 % | HEART RATE: 74 BPM | SYSTOLIC BLOOD PRESSURE: 126 MMHG | WEIGHT: 186 LBS | DIASTOLIC BLOOD PRESSURE: 72 MMHG | TEMPERATURE: 98.4 F

## 2017-06-21 PROCEDURE — G0157 HHC PT ASSISTANT EA 15: HCPCS

## 2017-06-21 PROCEDURE — G0300 HHS/HOSPICE OF LPN EA 15 MIN: HCPCS

## 2017-06-21 PROCEDURE — 3331090001 HH PPS REVENUE CREDIT

## 2017-06-21 PROCEDURE — 3331090002 HH PPS REVENUE DEBIT

## 2017-06-22 ENCOUNTER — HOME CARE VISIT (OUTPATIENT)
Dept: SCHEDULING | Facility: HOME HEALTH | Age: 82
End: 2017-06-22
Payer: MEDICARE

## 2017-06-22 VITALS
DIASTOLIC BLOOD PRESSURE: 62 MMHG | OXYGEN SATURATION: 95 % | TEMPERATURE: 97.1 F | SYSTOLIC BLOOD PRESSURE: 108 MMHG | HEART RATE: 74 BPM | RESPIRATION RATE: 18 BRPM

## 2017-06-22 VITALS
SYSTOLIC BLOOD PRESSURE: 126 MMHG | DIASTOLIC BLOOD PRESSURE: 72 MMHG | WEIGHT: 186 LBS | OXYGEN SATURATION: 94 % | HEART RATE: 74 BPM | BODY MASS INDEX: 35.14 KG/M2 | RESPIRATION RATE: 16 BRPM | TEMPERATURE: 98.6 F

## 2017-06-22 PROCEDURE — G0158 HHC OT ASSISTANT EA 15: HCPCS

## 2017-06-22 PROCEDURE — 3331090002 HH PPS REVENUE DEBIT

## 2017-06-22 PROCEDURE — 3331090001 HH PPS REVENUE CREDIT

## 2017-06-22 PROCEDURE — G0156 HHCP-SVS OF AIDE,EA 15 MIN: HCPCS

## 2017-06-23 ENCOUNTER — HOME CARE VISIT (OUTPATIENT)
Dept: SCHEDULING | Facility: HOME HEALTH | Age: 82
End: 2017-06-23
Payer: MEDICARE

## 2017-06-23 VITALS
OXYGEN SATURATION: 95 % | RESPIRATION RATE: 16 BRPM | HEART RATE: 74 BPM | SYSTOLIC BLOOD PRESSURE: 128 MMHG | DIASTOLIC BLOOD PRESSURE: 72 MMHG

## 2017-06-23 VITALS
SYSTOLIC BLOOD PRESSURE: 120 MMHG | DIASTOLIC BLOOD PRESSURE: 62 MMHG | OXYGEN SATURATION: 97 % | HEART RATE: 62 BPM | RESPIRATION RATE: 16 BRPM | TEMPERATURE: 98.1 F

## 2017-06-23 PROCEDURE — G0300 HHS/HOSPICE OF LPN EA 15 MIN: HCPCS

## 2017-06-23 PROCEDURE — G0156 HHCP-SVS OF AIDE,EA 15 MIN: HCPCS

## 2017-06-23 PROCEDURE — 3331090001 HH PPS REVENUE CREDIT

## 2017-06-23 PROCEDURE — G0157 HHC PT ASSISTANT EA 15: HCPCS

## 2017-06-23 PROCEDURE — 3331090002 HH PPS REVENUE DEBIT

## 2017-06-24 PROCEDURE — 3331090002 HH PPS REVENUE DEBIT

## 2017-06-24 PROCEDURE — 3331090001 HH PPS REVENUE CREDIT

## 2017-06-25 PROCEDURE — 3331090002 HH PPS REVENUE DEBIT

## 2017-06-25 PROCEDURE — 3331090001 HH PPS REVENUE CREDIT

## 2017-06-26 ENCOUNTER — HOME CARE VISIT (OUTPATIENT)
Dept: SCHEDULING | Facility: HOME HEALTH | Age: 82
End: 2017-06-26
Payer: MEDICARE

## 2017-06-26 VITALS
RESPIRATION RATE: 22 BRPM | HEART RATE: 62 BPM | DIASTOLIC BLOOD PRESSURE: 68 MMHG | TEMPERATURE: 98 F | SYSTOLIC BLOOD PRESSURE: 128 MMHG | OXYGEN SATURATION: 97 %

## 2017-06-26 VITALS
TEMPERATURE: 97.9 F | RESPIRATION RATE: 16 BRPM | OXYGEN SATURATION: 95 % | DIASTOLIC BLOOD PRESSURE: 82 MMHG | HEART RATE: 73 BPM | SYSTOLIC BLOOD PRESSURE: 126 MMHG

## 2017-06-26 PROCEDURE — G0299 HHS/HOSPICE OF RN EA 15 MIN: HCPCS

## 2017-06-26 PROCEDURE — G0157 HHC PT ASSISTANT EA 15: HCPCS

## 2017-06-26 PROCEDURE — 3331090002 HH PPS REVENUE DEBIT

## 2017-06-26 PROCEDURE — 3331090001 HH PPS REVENUE CREDIT

## 2017-06-27 ENCOUNTER — HOME CARE VISIT (OUTPATIENT)
Dept: SCHEDULING | Facility: HOME HEALTH | Age: 82
End: 2017-06-27
Payer: MEDICARE

## 2017-06-27 VITALS
HEART RATE: 72 BPM | RESPIRATION RATE: 18 BRPM | DIASTOLIC BLOOD PRESSURE: 70 MMHG | OXYGEN SATURATION: 94 % | SYSTOLIC BLOOD PRESSURE: 122 MMHG | TEMPERATURE: 97 F

## 2017-06-27 PROCEDURE — G0158 HHC OT ASSISTANT EA 15: HCPCS

## 2017-06-27 PROCEDURE — 3331090002 HH PPS REVENUE DEBIT

## 2017-06-27 PROCEDURE — 3331090001 HH PPS REVENUE CREDIT

## 2017-06-28 ENCOUNTER — HOME CARE VISIT (OUTPATIENT)
Dept: SCHEDULING | Facility: HOME HEALTH | Age: 82
End: 2017-06-28
Payer: MEDICARE

## 2017-06-28 VITALS
TEMPERATURE: 98.6 F | DIASTOLIC BLOOD PRESSURE: 64 MMHG | RESPIRATION RATE: 18 BRPM | OXYGEN SATURATION: 98 % | HEART RATE: 67 BPM | SYSTOLIC BLOOD PRESSURE: 100 MMHG

## 2017-06-28 PROCEDURE — G0157 HHC PT ASSISTANT EA 15: HCPCS

## 2017-06-28 PROCEDURE — G0300 HHS/HOSPICE OF LPN EA 15 MIN: HCPCS

## 2017-06-28 PROCEDURE — 3331090002 HH PPS REVENUE DEBIT

## 2017-06-28 PROCEDURE — 3331090001 HH PPS REVENUE CREDIT

## 2017-06-29 ENCOUNTER — HOME CARE VISIT (OUTPATIENT)
Dept: SCHEDULING | Facility: HOME HEALTH | Age: 82
End: 2017-06-29
Payer: MEDICARE

## 2017-06-29 VITALS
DIASTOLIC BLOOD PRESSURE: 62 MMHG | TEMPERATURE: 98.4 F | BODY MASS INDEX: 34.96 KG/M2 | RESPIRATION RATE: 17 BRPM | SYSTOLIC BLOOD PRESSURE: 100 MMHG | WEIGHT: 185 LBS | OXYGEN SATURATION: 94 % | HEART RATE: 67 BPM

## 2017-06-29 PROCEDURE — 3331090001 HH PPS REVENUE CREDIT

## 2017-06-29 PROCEDURE — G0156 HHCP-SVS OF AIDE,EA 15 MIN: HCPCS

## 2017-06-29 PROCEDURE — 3331090002 HH PPS REVENUE DEBIT

## 2017-06-29 PROCEDURE — G0158 HHC OT ASSISTANT EA 15: HCPCS

## 2017-06-30 ENCOUNTER — HOME CARE VISIT (OUTPATIENT)
Dept: SCHEDULING | Facility: HOME HEALTH | Age: 82
End: 2017-06-30
Payer: MEDICARE

## 2017-06-30 VITALS
OXYGEN SATURATION: 94 % | RESPIRATION RATE: 18 BRPM | HEART RATE: 58 BPM | TEMPERATURE: 97.3 F | DIASTOLIC BLOOD PRESSURE: 70 MMHG | SYSTOLIC BLOOD PRESSURE: 128 MMHG

## 2017-06-30 VITALS
OXYGEN SATURATION: 97 % | RESPIRATION RATE: 17 BRPM | TEMPERATURE: 97.6 F | HEART RATE: 60 BPM | SYSTOLIC BLOOD PRESSURE: 108 MMHG | DIASTOLIC BLOOD PRESSURE: 62 MMHG

## 2017-06-30 PROCEDURE — G0299 HHS/HOSPICE OF RN EA 15 MIN: HCPCS

## 2017-06-30 PROCEDURE — G0157 HHC PT ASSISTANT EA 15: HCPCS

## 2017-06-30 PROCEDURE — 3331090001 HH PPS REVENUE CREDIT

## 2017-06-30 PROCEDURE — 3331090002 HH PPS REVENUE DEBIT

## 2017-07-01 PROCEDURE — 3331090002 HH PPS REVENUE DEBIT

## 2017-07-01 PROCEDURE — 3331090001 HH PPS REVENUE CREDIT

## 2017-07-02 VITALS
RESPIRATION RATE: 18 BRPM | BODY MASS INDEX: 35.07 KG/M2 | DIASTOLIC BLOOD PRESSURE: 60 MMHG | WEIGHT: 185.6 LBS | HEART RATE: 63 BPM | TEMPERATURE: 97.5 F | SYSTOLIC BLOOD PRESSURE: 102 MMHG | OXYGEN SATURATION: 98 %

## 2017-07-02 PROCEDURE — 3331090002 HH PPS REVENUE DEBIT

## 2017-07-02 PROCEDURE — 3331090001 HH PPS REVENUE CREDIT

## 2017-07-03 ENCOUNTER — HOME CARE VISIT (OUTPATIENT)
Dept: SCHEDULING | Facility: HOME HEALTH | Age: 82
End: 2017-07-03
Payer: MEDICARE

## 2017-07-03 VITALS
OXYGEN SATURATION: 97 % | WEIGHT: 184.6 LBS | SYSTOLIC BLOOD PRESSURE: 104 MMHG | HEART RATE: 54 BPM | BODY MASS INDEX: 34.88 KG/M2 | TEMPERATURE: 97.8 F | DIASTOLIC BLOOD PRESSURE: 68 MMHG | RESPIRATION RATE: 16 BRPM

## 2017-07-03 PROCEDURE — G0300 HHS/HOSPICE OF LPN EA 15 MIN: HCPCS

## 2017-07-03 PROCEDURE — 3331090002 HH PPS REVENUE DEBIT

## 2017-07-03 PROCEDURE — G0157 HHC PT ASSISTANT EA 15: HCPCS

## 2017-07-03 PROCEDURE — 3331090001 HH PPS REVENUE CREDIT

## 2017-07-04 ENCOUNTER — HOME CARE VISIT (OUTPATIENT)
Dept: SCHEDULING | Facility: HOME HEALTH | Age: 82
End: 2017-07-04
Payer: MEDICARE

## 2017-07-04 PROCEDURE — 3331090001 HH PPS REVENUE CREDIT

## 2017-07-04 PROCEDURE — 3331090002 HH PPS REVENUE DEBIT

## 2017-07-05 ENCOUNTER — HOME CARE VISIT (OUTPATIENT)
Dept: SCHEDULING | Facility: HOME HEALTH | Age: 82
End: 2017-07-05
Payer: MEDICARE

## 2017-07-05 VITALS
DIASTOLIC BLOOD PRESSURE: 60 MMHG | HEART RATE: 60 BPM | OXYGEN SATURATION: 98 % | TEMPERATURE: 97.6 F | SYSTOLIC BLOOD PRESSURE: 100 MMHG

## 2017-07-05 VITALS
RESPIRATION RATE: 18 BRPM | HEART RATE: 54 BPM | TEMPERATURE: 97.8 F | OXYGEN SATURATION: 96 % | DIASTOLIC BLOOD PRESSURE: 60 MMHG | SYSTOLIC BLOOD PRESSURE: 90 MMHG

## 2017-07-05 PROCEDURE — G0152 HHCP-SERV OF OT,EA 15 MIN: HCPCS

## 2017-07-05 PROCEDURE — 3331090001 HH PPS REVENUE CREDIT

## 2017-07-05 PROCEDURE — G0300 HHS/HOSPICE OF LPN EA 15 MIN: HCPCS

## 2017-07-05 PROCEDURE — 3331090002 HH PPS REVENUE DEBIT

## 2017-07-06 ENCOUNTER — HOME CARE VISIT (OUTPATIENT)
Dept: SCHEDULING | Facility: HOME HEALTH | Age: 82
End: 2017-07-06
Payer: MEDICARE

## 2017-07-06 VITALS
HEART RATE: 60 BPM | OXYGEN SATURATION: 94 % | DIASTOLIC BLOOD PRESSURE: 60 MMHG | BODY MASS INDEX: 34.62 KG/M2 | TEMPERATURE: 98.1 F | SYSTOLIC BLOOD PRESSURE: 100 MMHG | WEIGHT: 183.2 LBS | RESPIRATION RATE: 18 BRPM

## 2017-07-06 PROCEDURE — G0157 HHC PT ASSISTANT EA 15: HCPCS

## 2017-07-06 PROCEDURE — 3331090002 HH PPS REVENUE DEBIT

## 2017-07-06 PROCEDURE — G0156 HHCP-SVS OF AIDE,EA 15 MIN: HCPCS

## 2017-07-06 PROCEDURE — 3331090001 HH PPS REVENUE CREDIT

## 2017-07-07 ENCOUNTER — HOME CARE VISIT (OUTPATIENT)
Dept: SCHEDULING | Facility: HOME HEALTH | Age: 82
End: 2017-07-07
Payer: MEDICARE

## 2017-07-07 VITALS
HEART RATE: 61 BPM | OXYGEN SATURATION: 95 % | SYSTOLIC BLOOD PRESSURE: 124 MMHG | DIASTOLIC BLOOD PRESSURE: 80 MMHG | TEMPERATURE: 98.3 F

## 2017-07-07 PROCEDURE — 3331090002 HH PPS REVENUE DEBIT

## 2017-07-07 PROCEDURE — 3331090001 HH PPS REVENUE CREDIT

## 2017-07-07 PROCEDURE — G0299 HHS/HOSPICE OF RN EA 15 MIN: HCPCS

## 2017-07-08 VITALS
OXYGEN SATURATION: 98 % | HEART RATE: 70 BPM | BODY MASS INDEX: 34.58 KG/M2 | RESPIRATION RATE: 22 BRPM | WEIGHT: 183 LBS | DIASTOLIC BLOOD PRESSURE: 82 MMHG | SYSTOLIC BLOOD PRESSURE: 134 MMHG | TEMPERATURE: 97.4 F

## 2017-07-08 PROCEDURE — 3331090001 HH PPS REVENUE CREDIT

## 2017-07-08 PROCEDURE — 3331090002 HH PPS REVENUE DEBIT

## 2017-07-09 PROCEDURE — 3331090001 HH PPS REVENUE CREDIT

## 2017-07-09 PROCEDURE — 3331090002 HH PPS REVENUE DEBIT

## 2017-07-10 ENCOUNTER — HOME CARE VISIT (OUTPATIENT)
Dept: SCHEDULING | Facility: HOME HEALTH | Age: 82
End: 2017-07-10
Payer: MEDICARE

## 2017-07-10 PROCEDURE — G0157 HHC PT ASSISTANT EA 15: HCPCS

## 2017-07-10 PROCEDURE — 3331090002 HH PPS REVENUE DEBIT

## 2017-07-10 PROCEDURE — 3331090001 HH PPS REVENUE CREDIT

## 2017-07-11 ENCOUNTER — HOME CARE VISIT (OUTPATIENT)
Dept: SCHEDULING | Facility: HOME HEALTH | Age: 82
End: 2017-07-11
Payer: MEDICARE

## 2017-07-11 VITALS
HEART RATE: 56 BPM | RESPIRATION RATE: 15 BRPM | DIASTOLIC BLOOD PRESSURE: 60 MMHG | TEMPERATURE: 97.9 F | SYSTOLIC BLOOD PRESSURE: 112 MMHG | OXYGEN SATURATION: 94 %

## 2017-07-11 PROCEDURE — 3331090001 HH PPS REVENUE CREDIT

## 2017-07-11 PROCEDURE — G0300 HHS/HOSPICE OF LPN EA 15 MIN: HCPCS

## 2017-07-11 PROCEDURE — 3331090002 HH PPS REVENUE DEBIT

## 2017-07-12 VITALS
DIASTOLIC BLOOD PRESSURE: 50 MMHG | TEMPERATURE: 98.4 F | RESPIRATION RATE: 18 BRPM | SYSTOLIC BLOOD PRESSURE: 100 MMHG | HEART RATE: 72 BPM | WEIGHT: 183 LBS | OXYGEN SATURATION: 98 % | BODY MASS INDEX: 34.58 KG/M2

## 2017-07-12 PROCEDURE — 3331090001 HH PPS REVENUE CREDIT

## 2017-07-12 PROCEDURE — 3331090002 HH PPS REVENUE DEBIT

## 2017-07-13 PROCEDURE — 3331090001 HH PPS REVENUE CREDIT

## 2017-07-13 PROCEDURE — 3331090002 HH PPS REVENUE DEBIT

## 2017-07-14 ENCOUNTER — HOME CARE VISIT (OUTPATIENT)
Dept: HOME HEALTH SERVICES | Facility: HOME HEALTH | Age: 82
End: 2017-07-14
Payer: MEDICARE

## 2017-07-14 ENCOUNTER — HOME CARE VISIT (OUTPATIENT)
Dept: SCHEDULING | Facility: HOME HEALTH | Age: 82
End: 2017-07-14
Payer: MEDICARE

## 2017-07-14 VITALS
TEMPERATURE: 97.8 F | RESPIRATION RATE: 22 BRPM | SYSTOLIC BLOOD PRESSURE: 102 MMHG | HEART RATE: 62 BPM | DIASTOLIC BLOOD PRESSURE: 64 MMHG | OXYGEN SATURATION: 97 %

## 2017-07-14 PROCEDURE — 3331090001 HH PPS REVENUE CREDIT

## 2017-07-14 PROCEDURE — G0156 HHCP-SVS OF AIDE,EA 15 MIN: HCPCS

## 2017-07-14 PROCEDURE — G0151 HHCP-SERV OF PT,EA 15 MIN: HCPCS

## 2017-07-14 PROCEDURE — G0299 HHS/HOSPICE OF RN EA 15 MIN: HCPCS

## 2017-07-14 PROCEDURE — 3331090002 HH PPS REVENUE DEBIT

## 2017-07-15 PROCEDURE — 3331090001 HH PPS REVENUE CREDIT

## 2017-07-15 PROCEDURE — 3331090002 HH PPS REVENUE DEBIT

## 2017-07-16 PROCEDURE — 3331090001 HH PPS REVENUE CREDIT

## 2017-07-16 PROCEDURE — 3331090002 HH PPS REVENUE DEBIT

## 2017-07-17 ENCOUNTER — HOSPITAL ENCOUNTER (OUTPATIENT)
Dept: WOUND CARE | Age: 82
Discharge: HOME | End: 2017-07-17
Payer: MEDICARE

## 2017-07-17 VITALS
DIASTOLIC BLOOD PRESSURE: 60 MMHG | OXYGEN SATURATION: 98 % | HEART RATE: 74 BPM | SYSTOLIC BLOOD PRESSURE: 102 MMHG | TEMPERATURE: 97.4 F

## 2017-07-17 PROCEDURE — 3331090001 HH PPS REVENUE CREDIT

## 2017-07-17 PROCEDURE — 99213 OFFICE O/P EST LOW 20 MIN: CPT | Performed by: SURGERY

## 2017-07-17 PROCEDURE — 3331090002 HH PPS REVENUE DEBIT

## 2017-07-17 RX ORDER — LIDOCAINE HYDROCHLORIDE 20 MG/ML
JELLY TOPICAL AS NEEDED
Status: DISCONTINUED | OUTPATIENT
Start: 2017-07-17 | End: 2017-07-21 | Stop reason: HOSPADM

## 2017-07-17 RX ADMIN — LIDOCAINE HYDROCHLORIDE: 20 JELLY TOPICAL at 14:12

## 2017-07-18 ENCOUNTER — HOME CARE VISIT (OUTPATIENT)
Dept: SCHEDULING | Facility: HOME HEALTH | Age: 82
End: 2017-07-18
Payer: MEDICARE

## 2017-07-18 VITALS
OXYGEN SATURATION: 98 % | SYSTOLIC BLOOD PRESSURE: 104 MMHG | HEART RATE: 72 BPM | TEMPERATURE: 97.9 F | RESPIRATION RATE: 18 BRPM | BODY MASS INDEX: 34.43 KG/M2 | WEIGHT: 182.2 LBS | DIASTOLIC BLOOD PRESSURE: 70 MMHG

## 2017-07-18 PROCEDURE — G0156 HHCP-SVS OF AIDE,EA 15 MIN: HCPCS

## 2017-07-18 PROCEDURE — 3331090001 HH PPS REVENUE CREDIT

## 2017-07-18 PROCEDURE — 3331090002 HH PPS REVENUE DEBIT

## 2017-07-18 PROCEDURE — G0299 HHS/HOSPICE OF RN EA 15 MIN: HCPCS

## 2017-07-19 ENCOUNTER — PATIENT OUTREACH (OUTPATIENT)
Dept: CASE MANAGEMENT | Age: 82
End: 2017-07-19

## 2017-07-19 PROCEDURE — 3331090002 HH PPS REVENUE DEBIT

## 2017-07-19 PROCEDURE — 3331090001 HH PPS REVENUE CREDIT

## 2017-07-19 NOTE — PROGRESS NOTES
Heart Failure Care Coordinator viewed the patients [address] portal to perform post hospitalization weekly assessment.  Verified  and address as identifiers. Viewed  notes, vitals pertaining to patient. Will contact patient directly once home health course complete. Will contact [address]  if needs arise.

## 2017-07-19 NOTE — H&P
[facility]   [place], [address]   WOUND CARE HISTORY AND PHYSICAL       Name:  Emeli Dillon   MR#:  [id number]   :  1933   Account #:  [de-identified]        Date of Adm:  2017       DATE OF SERVICE:  2017    The patient presents for evaluation of a small sacral decubitus which   she reports occurred while she was in a skilled nursing facility   recovering from a recent hospitalization. She reports that it occurred 6   months ago. She has had routine wound care at [facility] and she   is not sure what the size has done. PAST MEDICAL HISTORY: Significant for sleep apnea, congestive   heart failure with fluid overload, non-insulin dependent diabetes, gout. She reports that is now at home and is ambulatory around the house. When she is sitting and not moving she is sitting on a thick pad of egg   crate. CURRENT MEDICATIONS:   1.  Metoprolol. 2.  Oxybutynin. 3.  Eliquis. 4.  Levemir. 5.  Lovastatin. 6.  Lasix. 7.  Lisinopril. 8.  Escitalopram.    ALLERGIES:  She has no known drug allergies. FAMILY HISTORY:  Noncontributory. REVIEW OF SYSTEMS: Negative for weight change, fever,   headache. No chest pain, palpitations, shortness of breath. No cough   or wheezing. No change in bowel or bladder habits. No neurologic or   psychiatric symptoms. No easy bruising. PHYSICAL EXAMINATION   GENERAL: She is an elderly but pleasant and alert woman in no acute   distress. HEAD AND NECK: Unremarkable. LUNGS: Clear. HEART: Regular. ABDOMEN: Soft, nontender, nondistended. She has good femoral pulses   bilaterally. EXTREMITIES: No lower extremity wounds. On her sacrum, she has a small 12 x 11 mm wound which is 17 mm in depth. It has a clean granulating base. There is no undermining or tunneling. No   exposed bone or tendon. No odor or drainage.      IMPRESSION:  Small clean sacral decubitus. PLAN: Will have the family fill this with [place] daily. She will continue   to use pressure relieving aids at home, and we will see her back in 2 weeks.              MD STACEY Day / [facility]   D:  07/17/2017   16:41   T:  07/19/2017   12:17   Job #:  [id number]

## 2017-07-20 PROCEDURE — 3331090001 HH PPS REVENUE CREDIT

## 2017-07-20 PROCEDURE — 3331090002 HH PPS REVENUE DEBIT

## 2017-07-21 ENCOUNTER — HOME CARE VISIT (OUTPATIENT)
Dept: SCHEDULING | Facility: HOME HEALTH | Age: 82
End: 2017-07-21
Payer: MEDICARE

## 2017-07-21 PROCEDURE — G0300 HHS/HOSPICE OF LPN EA 15 MIN: HCPCS

## 2017-07-21 PROCEDURE — 3331090001 HH PPS REVENUE CREDIT

## 2017-07-21 PROCEDURE — 3331090002 HH PPS REVENUE DEBIT

## 2017-07-21 PROCEDURE — G0156 HHCP-SVS OF AIDE,EA 15 MIN: HCPCS

## 2017-07-22 VITALS
HEART RATE: 78 BPM | DIASTOLIC BLOOD PRESSURE: 76 MMHG | SYSTOLIC BLOOD PRESSURE: 100 MMHG | TEMPERATURE: 98 F | OXYGEN SATURATION: 98 % | RESPIRATION RATE: 18 BRPM

## 2017-07-22 PROCEDURE — 3331090001 HH PPS REVENUE CREDIT

## 2017-07-22 PROCEDURE — 3331090002 HH PPS REVENUE DEBIT

## 2017-07-23 PROCEDURE — 3331090001 HH PPS REVENUE CREDIT

## 2017-07-23 PROCEDURE — 3331090002 HH PPS REVENUE DEBIT

## 2017-07-24 PROCEDURE — 3331090002 HH PPS REVENUE DEBIT

## 2017-07-24 PROCEDURE — 3331090001 HH PPS REVENUE CREDIT

## 2017-07-25 ENCOUNTER — HOME CARE VISIT (OUTPATIENT)
Dept: SCHEDULING | Facility: HOME HEALTH | Age: 82
End: 2017-07-25
Payer: MEDICARE

## 2017-07-25 PROCEDURE — G0156 HHCP-SVS OF AIDE,EA 15 MIN: HCPCS

## 2017-07-25 PROCEDURE — 3331090002 HH PPS REVENUE DEBIT

## 2017-07-25 PROCEDURE — 3331090001 HH PPS REVENUE CREDIT

## 2017-07-26 ENCOUNTER — HOME CARE VISIT (OUTPATIENT)
Dept: SCHEDULING | Facility: HOME HEALTH | Age: 82
End: 2017-07-26
Payer: MEDICARE

## 2017-07-26 VITALS
TEMPERATURE: 98.3 F | WEIGHT: 178.2 LBS | DIASTOLIC BLOOD PRESSURE: 60 MMHG | SYSTOLIC BLOOD PRESSURE: 112 MMHG | BODY MASS INDEX: 33.67 KG/M2 | OXYGEN SATURATION: 96 % | HEART RATE: 78 BPM | RESPIRATION RATE: 18 BRPM

## 2017-07-26 PROCEDURE — 3331090001 HH PPS REVENUE CREDIT

## 2017-07-26 PROCEDURE — G0299 HHS/HOSPICE OF RN EA 15 MIN: HCPCS

## 2017-07-26 PROCEDURE — 3331090002 HH PPS REVENUE DEBIT

## 2017-07-27 PROCEDURE — 3331090002 HH PPS REVENUE DEBIT

## 2017-07-27 PROCEDURE — 3331090001 HH PPS REVENUE CREDIT

## 2017-07-28 ENCOUNTER — HOME CARE VISIT (OUTPATIENT)
Dept: SCHEDULING | Facility: HOME HEALTH | Age: 82
End: 2017-07-28
Payer: MEDICARE

## 2017-07-28 PROCEDURE — 3331090002 HH PPS REVENUE DEBIT

## 2017-07-28 PROCEDURE — 3331090001 HH PPS REVENUE CREDIT

## 2017-07-28 PROCEDURE — G0299 HHS/HOSPICE OF RN EA 15 MIN: HCPCS

## 2017-07-29 PROCEDURE — 3331090002 HH PPS REVENUE DEBIT

## 2017-07-29 PROCEDURE — 3331090001 HH PPS REVENUE CREDIT

## 2017-07-30 PROCEDURE — 3331090002 HH PPS REVENUE DEBIT

## 2017-07-30 PROCEDURE — 3331090001 HH PPS REVENUE CREDIT

## 2017-07-31 ENCOUNTER — HOSPITAL ENCOUNTER (OUTPATIENT)
Dept: WOUND CARE | Age: 82
Discharge: HOME | End: 2017-07-31
Payer: MEDICARE

## 2017-07-31 VITALS
RESPIRATION RATE: 18 BRPM | TEMPERATURE: 97.3 F | OXYGEN SATURATION: 98 % | WEIGHT: 179.2 LBS | DIASTOLIC BLOOD PRESSURE: 72 MMHG | HEART RATE: 68 BPM | BODY MASS INDEX: 33.86 KG/M2 | SYSTOLIC BLOOD PRESSURE: 116 MMHG

## 2017-07-31 PROCEDURE — 99213 OFFICE O/P EST LOW 20 MIN: CPT | Performed by: SURGERY

## 2017-07-31 PROCEDURE — 99212 OFFICE O/P EST SF 10 MIN: CPT | Performed by: SURGERY

## 2017-07-31 PROCEDURE — 3331090001 HH PPS REVENUE CREDIT

## 2017-07-31 PROCEDURE — 3331090002 HH PPS REVENUE DEBIT

## 2017-07-31 RX ORDER — LIDOCAINE HYDROCHLORIDE 40 MG/ML
SOLUTION TOPICAL ONCE
Status: COMPLETED | OUTPATIENT
Start: 2017-07-31 | End: 2017-07-31

## 2017-07-31 RX ADMIN — LIDOCAINE HYDROCHLORIDE: 40 SOLUTION TOPICAL at 15:00

## 2017-08-01 ENCOUNTER — HOME CARE VISIT (OUTPATIENT)
Dept: HOME HEALTH SERVICES | Facility: HOME HEALTH | Age: 82
End: 2017-08-01
Payer: MEDICARE

## 2017-08-01 PROCEDURE — 3331090002 HH PPS REVENUE DEBIT

## 2017-08-01 PROCEDURE — 3331090001 HH PPS REVENUE CREDIT

## 2017-08-01 NOTE — PROGRESS NOTES
[phone] [place]   WOUND CARE PROGRESS NOTE       Name:  Johnathon Ponce   MR#:  [id number]   :  1933   Account #:  [de-identified]        Date of Adm:  2017       DATE OF SERVICE: 2017     SUBJECTIVE: The patient returns for followup of her sacral wound. She was seen 2 weeks ago. This has been treated with Medihoney   gel. OBJECTIVE:  The wound today is smaller at 0.7 x 0.8, 15-20 mm in   depth. There is no significant undermining. The base of it is clean. There is no exposed bone or tendon. No odor or drainage. PLAN: She will continue with the same wound care, and will see her   back in 3 weeks.         MD Paul Watson / Sandro Platt   D:  2017   16:12   T:  2017   21:25   Job #:  [id number]

## 2017-08-02 PROCEDURE — 3331090001 HH PPS REVENUE CREDIT

## 2017-08-02 PROCEDURE — 3331090002 HH PPS REVENUE DEBIT

## 2017-08-03 PROCEDURE — 3331090001 HH PPS REVENUE CREDIT

## 2017-08-03 PROCEDURE — 3331090002 HH PPS REVENUE DEBIT

## 2017-08-04 ENCOUNTER — HOME CARE VISIT (OUTPATIENT)
Dept: HOME HEALTH SERVICES | Facility: HOME HEALTH | Age: 82
End: 2017-08-04
Payer: MEDICARE

## 2017-08-04 PROCEDURE — 3331090001 HH PPS REVENUE CREDIT

## 2017-08-04 PROCEDURE — 3331090002 HH PPS REVENUE DEBIT

## 2017-08-05 PROCEDURE — 3331090001 HH PPS REVENUE CREDIT

## 2017-08-05 PROCEDURE — 3331090002 HH PPS REVENUE DEBIT

## 2017-08-06 PROCEDURE — 3331090001 HH PPS REVENUE CREDIT

## 2017-08-06 PROCEDURE — 3331090002 HH PPS REVENUE DEBIT

## 2017-08-07 ENCOUNTER — HOME CARE VISIT (OUTPATIENT)
Dept: SCHEDULING | Facility: HOME HEALTH | Age: 82
End: 2017-08-07
Payer: MEDICARE

## 2017-08-07 PROCEDURE — 3331090001 HH PPS REVENUE CREDIT

## 2017-08-07 PROCEDURE — 3331090002 HH PPS REVENUE DEBIT

## 2017-08-07 PROCEDURE — G0299 HHS/HOSPICE OF RN EA 15 MIN: HCPCS

## 2017-08-07 PROCEDURE — A6197 ALGINATE DRSG >16 <=48 SQ IN: HCPCS

## 2017-08-07 PROCEDURE — A6212 FOAM DRG <=16 SQ IN W/BORDER: HCPCS

## 2017-08-08 VITALS
RESPIRATION RATE: 20 BRPM | DIASTOLIC BLOOD PRESSURE: 72 MMHG | HEART RATE: 72 BPM | SYSTOLIC BLOOD PRESSURE: 126 MMHG | TEMPERATURE: 98.4 F | OXYGEN SATURATION: 97 %

## 2017-08-08 PROCEDURE — A6197 ALGINATE DRSG >16 <=48 SQ IN: HCPCS

## 2017-08-08 PROCEDURE — A6212 FOAM DRG <=16 SQ IN W/BORDER: HCPCS

## 2017-08-08 PROCEDURE — 3331090002 HH PPS REVENUE DEBIT

## 2017-08-08 PROCEDURE — 3331090001 HH PPS REVENUE CREDIT

## 2017-08-09 PROCEDURE — 3331090001 HH PPS REVENUE CREDIT

## 2017-08-09 PROCEDURE — 3331090002 HH PPS REVENUE DEBIT

## 2017-08-10 PROCEDURE — 3331090001 HH PPS REVENUE CREDIT

## 2017-08-10 PROCEDURE — 3331090002 HH PPS REVENUE DEBIT

## 2017-08-11 PROCEDURE — 3331090001 HH PPS REVENUE CREDIT

## 2017-08-11 PROCEDURE — 3331090002 HH PPS REVENUE DEBIT

## 2017-08-12 PROCEDURE — 3331090001 HH PPS REVENUE CREDIT

## 2017-08-12 PROCEDURE — 3331090002 HH PPS REVENUE DEBIT

## 2017-08-13 PROCEDURE — 3331090002 HH PPS REVENUE DEBIT

## 2017-08-13 PROCEDURE — 3331090001 HH PPS REVENUE CREDIT

## 2017-08-14 PROCEDURE — 3331090002 HH PPS REVENUE DEBIT

## 2017-08-14 PROCEDURE — 3331090001 HH PPS REVENUE CREDIT

## 2017-08-15 PROCEDURE — 3331090001 HH PPS REVENUE CREDIT

## 2017-08-15 PROCEDURE — 3331090002 HH PPS REVENUE DEBIT

## 2017-08-21 ENCOUNTER — HOSPITAL ENCOUNTER (OUTPATIENT)
Dept: WOUND CARE | Age: 82
End: 2017-08-21

## 2023-06-04 NOTE — PROGRESS NOTES
Hospitalist Progress Note    NAME: Luz Finley   :  1933   MRN:  [id number]     Assessment / Plan:  Acute hypoxic respiratory failure secondary to   Acute on chronic diastolic heart failure, Severe Pulmonary hypertension, Moderate AS  History of PE in 2016 on eliquis  bradycardia  Iv diuresis, daily weights (no significant change)  Echo 2016 EF 60% with moderate AS, severe pulmonary hypertension  Non complaint with lasix as outpaitent  -CXR- No acute cardiopulmonary disease radiographically. Minimal bibasilar atelectasis or scar.  -supplemental O2 to maintain sat >90%. Wean as tolerated    -A1C (5.6), LDL 34, TSH wnl  -continue statin, ACEi. On eliquis. Not on ASA. Decreased BB   -Started 60mg IV lasix bid. Decreased yest to 40 bid as creat rising. Now will stop.  -check repeat echo  -Follows with cardiology, Dr. Renate Cuello, consulted while inpatient.     DM II  -last A1C 5.6,  PTA lantus 35 units    Keep off the lantus for now, poc and sliding scale  Need to see whether she needs to be on lantus before discharge     Hx of PE/DVT  -prior PE may of 2016  -continue on pta eliquis . defer to PCP regarding the eliquis ( as not sure of the plan regarding anticoagulation for life time ? ??) pt has not filled this since 2016 and recommended dose is 2.5mg for 6 months BID     HTN  -pta lisinopril and toprol  -prn IV hydralazine  Hyperkalemia  -brannon excelate and monitor -repeat bmp later today    HLD-cont pta statin  CKD 3-stable, monitor    ZEE  Depression-pta lexapro     Obesity, BMI 39   Incidental CT head finding \"Upward convexity of the pituitary gland which is unusual in this age group but nonspecific. \"  -no obvious endocrine symptoms  - Thyroid studies and prolactin level ordered on admission - WNL    Stage 3 poa sacral ulcer       Code Status: full  Surrogate Decision Maker: son  DVT Prophylaxis: eliquis  Baseline: lives at home, uses walker         Subjective:     Chief Complaint / Reason for Physician Visit  No complaints. Discussed with RN events overnight. Review of Systems:  Symptom Y/N Comments  Symptom Y/N Comments   Fever/Chills     Chest Pain n    Poor Appetite    Edema y    Cough n   Abdominal pain     Sputum n   Joint Pain     SOB/VALDOVINOS n   Pruritis/Rash     Nausea/vomit    Tolerating PT/OT     Diarrhea     Tolerating Diet y    Constipation    Other       Could NOT obtain due to:      Objective:     VITALS:   Last 24hrs VS reviewed since prior progress note. Most recent are:  Patient Vitals for the past 24 hrs:   Temp Pulse Resp BP SpO2   04/26/17 0908 - - - - 95 %   04/26/17 0727 98 °F (36.7 °C) 60 18 92/61 92 %   04/26/17 0357 - - - 99/60 -   04/26/17 0330 98.4 °F (36.9 °C) (!) 50 20 (!) 88/57 93 %   04/26/17 0001 97.5 °F (36.4 °C) (!) 55 18 103/67 90 %   04/25/17 1933 98.4 °F (36.9 °C) (!) 51 18 120/60 -   04/25/17 1844 - - - - 95 %   04/25/17 1624 98.7 °F (37.1 °C) 62 18 144/69 94 %   04/25/17 1610 - (!) 56 - 109/54 95 %   04/25/17 1151 98.9 °F (37.2 °C) (!) 55 18 97/56 93 %       Intake/Output Summary (Last 24 hours) at 04/26/17 1100  Last data filed at 04/26/17 0616   Gross per 24 hour   Intake              440 ml   Output                0 ml   Net              440 ml        PHYSICAL EXAM:  General: WD, WN. Alert,   cooperative, no acute distress    EENT:  EOMI. Anicteric sclerae.  MMM  Resp:  CTA, + crackles  No accessory muscle use  CV:  Regular  rhythm,  + edema  GI:  Soft, Non distended, Non tender.  +Bowel sounds  Neurologic:  Alert and oriented X 3, normal speech,   Psych:   Good insight. Not anxious nor agitated  Skin:    No jaundice    Reviewed most current lab test results and cultures  YES  Reviewed most current radiology test results   YES  Review and summation of old records today    NO  Reviewed patient's current orders and MAR    YES  PMH/SH reviewed - no change compared to H&P  ________________________________________________________________________  Care Plan discussed with:    Comments   Patient y    Family      RN      Care Manager y    Consultant                        Multidiciplinary team rounds were held today with , nursing, pharmacist and clinical coordinator. Patient's plan of care was discussed; medications were reviewed and discharge planning was addressed. ________________________________________________________________________  Total NON critical care TIME:  Minutes  **   Total CRITICAL CARE TIME Spent:   Minutes non procedure based      Comments   >50% of visit spent in counseling and coordination of care     ________________________________________________________________________  Duncan Roberts MD     Procedures: see electronic medical records for all procedures/Xrays and details which were not copied into this note but were reviewed prior to creation of Plan. LABS:  I reviewed today's most current labs and imaging studies.   Pertinent labs include:  Recent Labs      04/26/17 0326  04/25/17   0406  04/24/17   0419   WBC  4.8  4.1  3.8   HGB  13.0  12.8  13.0   HCT  41.2  41.4  41.7   PLT  159  149*  132*     Recent Labs      04/26/17   0326  04/25/17   0406  04/24/17   0419  04/23/17   1207   NA  137  141  143  141   K  5.7*  4.8  4.7  5.1   CL  102  106  109*  108   CO2  28  26  21  24   GLU  111*  43*  81  94   BUN  55*  39*  36*  38*   CREA  1.65*  1.51*  1.29*  1.29*   CA  8.4*  8.5  8.7  8.8   ALB   --    --    --   3.3*   TBILI   --    --    --   1.3*   SGOT   --    --    --   23   ALT   --    --    --   16   INR   --    --    --   1.3*       Signed: Duncan Roberts MD no